# Patient Record
Sex: MALE | Race: WHITE | NOT HISPANIC OR LATINO | Employment: FULL TIME | URBAN - METROPOLITAN AREA
[De-identification: names, ages, dates, MRNs, and addresses within clinical notes are randomized per-mention and may not be internally consistent; named-entity substitution may affect disease eponyms.]

---

## 2023-02-28 LAB
ALANINE AMINOTRANSFERASE (SGPT) (U/L) IN SER/PLAS: 21 U/L (ref 10–52)
ALBUMIN (G/DL) IN SER/PLAS: 4.7 G/DL (ref 3.4–5)
ALKALINE PHOSPHATASE (U/L) IN SER/PLAS: 67 U/L (ref 33–136)
ANION GAP IN SER/PLAS: 12 MMOL/L (ref 10–20)
ASPARTATE AMINOTRANSFERASE (SGOT) (U/L) IN SER/PLAS: 30 U/L (ref 9–39)
BASOPHILS (10*3/UL) IN BLOOD BY AUTOMATED COUNT: 0.06 X10E9/L (ref 0–0.1)
BASOPHILS/100 LEUKOCYTES IN BLOOD BY AUTOMATED COUNT: 0.8 % (ref 0–2)
BILIRUBIN TOTAL (MG/DL) IN SER/PLAS: 0.7 MG/DL (ref 0–1.2)
CALCIDIOL (25 OH VITAMIN D3) (NG/ML) IN SER/PLAS: 19 NG/ML
CALCIUM (MG/DL) IN SER/PLAS: 9.8 MG/DL (ref 8.6–10.3)
CARBON DIOXIDE, TOTAL (MMOL/L) IN SER/PLAS: 27 MMOL/L (ref 21–32)
CHLORIDE (MMOL/L) IN SER/PLAS: 98 MMOL/L (ref 98–107)
COBALAMIN (VITAMIN B12) (PG/ML) IN SER/PLAS: 569 PG/ML (ref 211–911)
CREATININE (MG/DL) IN SER/PLAS: 0.92 MG/DL (ref 0.5–1.3)
EOSINOPHILS (10*3/UL) IN BLOOD BY AUTOMATED COUNT: 0.19 X10E9/L (ref 0–0.7)
EOSINOPHILS/100 LEUKOCYTES IN BLOOD BY AUTOMATED COUNT: 2.5 % (ref 0–6)
ERYTHROCYTE DISTRIBUTION WIDTH (RATIO) BY AUTOMATED COUNT: 12.6 % (ref 11.5–14.5)
ERYTHROCYTE MEAN CORPUSCULAR HEMOGLOBIN CONCENTRATION (G/DL) BY AUTOMATED: 33.4 G/DL (ref 32–36)
ERYTHROCYTE MEAN CORPUSCULAR VOLUME (FL) BY AUTOMATED COUNT: 96 FL (ref 80–100)
ERYTHROCYTES (10*6/UL) IN BLOOD BY AUTOMATED COUNT: 4.67 X10E12/L (ref 4.5–5.9)
GFR MALE: >90 ML/MIN/1.73M2
GLUCOSE (MG/DL) IN SER/PLAS: 96 MG/DL (ref 74–99)
HEMATOCRIT (%) IN BLOOD BY AUTOMATED COUNT: 44.6 % (ref 41–52)
HEMOGLOBIN (G/DL) IN BLOOD: 14.9 G/DL (ref 13.5–17.5)
IMMATURE GRANULOCYTES/100 LEUKOCYTES IN BLOOD BY AUTOMATED COUNT: 0.4 % (ref 0–0.9)
LEUKOCYTES (10*3/UL) IN BLOOD BY AUTOMATED COUNT: 7.7 X10E9/L (ref 4.4–11.3)
LYMPHOCYTES (10*3/UL) IN BLOOD BY AUTOMATED COUNT: 2.54 X10E9/L (ref 1.2–4.8)
LYMPHOCYTES/100 LEUKOCYTES IN BLOOD BY AUTOMATED COUNT: 33.2 % (ref 13–44)
MONOCYTES (10*3/UL) IN BLOOD BY AUTOMATED COUNT: 0.75 X10E9/L (ref 0.1–1)
MONOCYTES/100 LEUKOCYTES IN BLOOD BY AUTOMATED COUNT: 9.8 % (ref 2–10)
NEUTROPHILS (10*3/UL) IN BLOOD BY AUTOMATED COUNT: 4.08 X10E9/L (ref 1.2–7.7)
NEUTROPHILS/100 LEUKOCYTES IN BLOOD BY AUTOMATED COUNT: 53.3 % (ref 40–80)
PLATELETS (10*3/UL) IN BLOOD AUTOMATED COUNT: 355 X10E9/L (ref 150–450)
POTASSIUM (MMOL/L) IN SER/PLAS: 4 MMOL/L (ref 3.5–5.3)
PROTEIN TOTAL: 8.1 G/DL (ref 6.4–8.2)
SODIUM (MMOL/L) IN SER/PLAS: 133 MMOL/L (ref 136–145)
THYROTROPIN (MIU/L) IN SER/PLAS BY DETECTION LIMIT <= 0.05 MIU/L: 3.17 MIU/L (ref 0.44–3.98)
UREA NITROGEN (MG/DL) IN SER/PLAS: 11 MG/DL (ref 6–23)

## 2023-03-06 PROBLEM — R07.9 CHEST PAIN: Status: ACTIVE | Noted: 2023-03-06

## 2023-03-06 PROBLEM — J45.909 REACTIVE AIRWAY DISEASE (HHS-HCC): Status: ACTIVE | Noted: 2023-03-06

## 2023-03-06 PROBLEM — M47.9 DEGENERATIVE ARTHRITIS OF SPINE: Status: ACTIVE | Noted: 2023-03-06

## 2023-03-06 PROBLEM — K21.9 ESOPHAGEAL REFLUX: Status: ACTIVE | Noted: 2023-03-06

## 2023-03-06 PROBLEM — M25.511 BILATERAL SHOULDER PAIN: Status: ACTIVE | Noted: 2023-03-06

## 2023-03-06 PROBLEM — K40.90 INGUINAL HERNIA, RIGHT: Status: ACTIVE | Noted: 2023-03-06

## 2023-03-06 PROBLEM — M25.562 LEFT KNEE PAIN: Status: ACTIVE | Noted: 2023-03-06

## 2023-03-06 PROBLEM — R10.31 CHRONIC RLQ PAIN: Status: ACTIVE | Noted: 2023-03-06

## 2023-03-06 PROBLEM — E78.5 HYPERLIPIDEMIA: Status: ACTIVE | Noted: 2023-03-06

## 2023-03-06 PROBLEM — R21 SKIN RASH: Status: ACTIVE | Noted: 2023-03-06

## 2023-03-06 PROBLEM — R53.83 FATIGUE: Status: ACTIVE | Noted: 2023-03-06

## 2023-03-06 PROBLEM — G89.29 CHRONIC RLQ PAIN: Status: ACTIVE | Noted: 2023-03-06

## 2023-03-06 PROBLEM — N52.9 MALE ERECTILE DISORDER: Status: ACTIVE | Noted: 2023-03-06

## 2023-03-06 PROBLEM — R79.89 TSH ELEVATION: Status: ACTIVE | Noted: 2023-03-06

## 2023-03-06 PROBLEM — M25.512 BILATERAL SHOULDER PAIN: Status: ACTIVE | Noted: 2023-03-06

## 2023-03-06 PROBLEM — M70.21 OLECRANON BURSITIS OF RIGHT ELBOW: Status: ACTIVE | Noted: 2023-03-06

## 2023-03-06 PROBLEM — I10 BENIGN ESSENTIAL HYPERTENSION: Status: ACTIVE | Noted: 2023-03-06

## 2023-03-06 PROBLEM — R97.20 ELEVATED PSA: Status: ACTIVE | Noted: 2023-03-06

## 2023-03-06 PROBLEM — N52.9 INABILITY TO ATTAIN ERECTION: Status: ACTIVE | Noted: 2023-03-06

## 2023-03-06 PROBLEM — R97.20 INCREASED PROSTATE SPECIFIC ANTIGEN (PSA) VELOCITY: Status: ACTIVE | Noted: 2023-03-06

## 2023-03-06 PROBLEM — E55.9 VITAMIN D DEFICIENCY: Status: ACTIVE | Noted: 2023-03-06

## 2023-03-06 PROBLEM — Z87.2 HISTORY OF ROSACEA: Status: ACTIVE | Noted: 2023-03-06

## 2023-03-06 PROBLEM — M54.16 BILATERAL LUMBAR RADICULOPATHY: Status: ACTIVE | Noted: 2023-03-06

## 2023-03-06 PROBLEM — R60.0 LEG EDEMA: Status: ACTIVE | Noted: 2023-03-06

## 2023-03-06 PROBLEM — N40.1 ENLARGED PROSTATE WITH LOWER URINARY TRACT SYMPTOMS (LUTS): Status: ACTIVE | Noted: 2023-03-06

## 2023-03-06 RX ORDER — TADALAFIL 10 MG/1
10 TABLET ORAL
COMMUNITY
Start: 2021-08-10 | End: 2023-12-05 | Stop reason: SDUPTHER

## 2023-03-06 RX ORDER — TRIAMCINOLONE ACETONIDE 1 MG/G
CREAM TOPICAL
COMMUNITY
End: 2023-12-22 | Stop reason: WASHOUT

## 2023-03-06 RX ORDER — TADALAFIL 5 MG/1
5 TABLET ORAL
COMMUNITY
Start: 2020-03-06 | End: 2023-03-13 | Stop reason: ALTCHOICE

## 2023-03-06 RX ORDER — PANTOPRAZOLE SODIUM 40 MG/1
40 TABLET, DELAYED RELEASE ORAL
COMMUNITY
Start: 2014-01-20 | End: 2023-03-26 | Stop reason: SDUPTHER

## 2023-03-06 RX ORDER — SILDENAFIL 100 MG/1
100 TABLET, FILM COATED ORAL AS NEEDED
COMMUNITY
Start: 2021-09-22 | End: 2023-03-13 | Stop reason: ALTCHOICE

## 2023-03-06 RX ORDER — TADALAFIL 20 MG/1
20 TABLET ORAL DAILY PRN
COMMUNITY
Start: 2022-07-06 | End: 2023-03-13 | Stop reason: ALTCHOICE

## 2023-03-06 RX ORDER — TAMSULOSIN HYDROCHLORIDE 0.4 MG/1
0.4 CAPSULE ORAL DAILY
COMMUNITY
End: 2023-12-22 | Stop reason: WASHOUT

## 2023-03-06 RX ORDER — LISINOPRIL 5 MG/1
5 TABLET ORAL DAILY
COMMUNITY
Start: 2014-01-20 | End: 2023-03-28

## 2023-03-06 RX ORDER — ATORVASTATIN CALCIUM 80 MG/1
80 TABLET, FILM COATED ORAL DAILY
COMMUNITY
Start: 2015-01-31 | End: 2023-03-28

## 2023-03-06 RX ORDER — ERGOCALCIFEROL 1.25 MG/1
1 CAPSULE ORAL
COMMUNITY
End: 2023-12-22 | Stop reason: WASHOUT

## 2023-03-06 RX ORDER — FUROSEMIDE 40 MG/1
40 TABLET ORAL DAILY
COMMUNITY
Start: 2020-03-06 | End: 2023-03-28

## 2023-03-06 RX ORDER — NAPROXEN 500 MG/1
500 TABLET ORAL EVERY 12 HOURS PRN
COMMUNITY
End: 2023-12-22 | Stop reason: WASHOUT

## 2023-03-06 RX ORDER — BUDESONIDE AND FORMOTEROL FUMARATE DIHYDRATE 160; 4.5 UG/1; UG/1
2 AEROSOL RESPIRATORY (INHALATION)
COMMUNITY
End: 2023-12-22 | Stop reason: WASHOUT

## 2023-03-13 ENCOUNTER — OFFICE VISIT (OUTPATIENT)
Dept: PRIMARY CARE | Facility: CLINIC | Age: 68
End: 2023-03-13
Payer: COMMERCIAL

## 2023-03-13 VITALS
DIASTOLIC BLOOD PRESSURE: 70 MMHG | WEIGHT: 204 LBS | HEART RATE: 70 BPM | BODY MASS INDEX: 30.13 KG/M2 | SYSTOLIC BLOOD PRESSURE: 126 MMHG

## 2023-03-13 DIAGNOSIS — M25.562 ACUTE PAIN OF LEFT KNEE: ICD-10-CM

## 2023-03-13 DIAGNOSIS — M25.551 RIGHT HIP PAIN: ICD-10-CM

## 2023-03-13 DIAGNOSIS — M70.21 OLECRANON BURSITIS OF RIGHT ELBOW: Primary | ICD-10-CM

## 2023-03-13 PROCEDURE — 99214 OFFICE O/P EST MOD 30 MIN: CPT | Performed by: FAMILY MEDICINE

## 2023-03-13 PROCEDURE — 3074F SYST BP LT 130 MM HG: CPT | Performed by: FAMILY MEDICINE

## 2023-03-13 PROCEDURE — 20605 DRAIN/INJ JOINT/BURSA W/O US: CPT | Performed by: FAMILY MEDICINE

## 2023-03-13 PROCEDURE — 1159F MED LIST DOCD IN RCRD: CPT | Performed by: FAMILY MEDICINE

## 2023-03-13 PROCEDURE — 3078F DIAST BP <80 MM HG: CPT | Performed by: FAMILY MEDICINE

## 2023-03-13 RX ORDER — MELOXICAM 15 MG/1
15 TABLET ORAL DAILY
Qty: 30 TABLET | Refills: 0 | Status: SHIPPED | OUTPATIENT
Start: 2023-03-13 | End: 2023-04-12

## 2023-03-13 ASSESSMENT — PATIENT HEALTH QUESTIONNAIRE - PHQ9
2. FEELING DOWN, DEPRESSED OR HOPELESS: NOT AT ALL
SUM OF ALL RESPONSES TO PHQ9 QUESTIONS 1 AND 2: 0
1. LITTLE INTEREST OR PLEASURE IN DOING THINGS: NOT AT ALL

## 2023-03-13 NOTE — ASSESSMENT & PLAN NOTE
Olecranon bursa aspiration and injection performed in office today  Recommend keeping compressive dressing on as much as tolerated as these have a high chance of recurrence  If this comes back again, we will set you up with orthopedic surgery

## 2023-03-13 NOTE — ASSESSMENT & PLAN NOTE
X-rays largely unremarkable  Pain extends past the joint line and seem to follow along the pes anserine tendons.  Possible pes anserine bursitis  Referral to Dr. Berny Arango of sports medicine for ultrasound-guided treatment  Physical therapy referral  Meloxicam 15 mg daily

## 2023-03-13 NOTE — ASSESSMENT & PLAN NOTE
Most likely secondary to tight musculature and mild greater trochanteric bursitis  Physical therapy referral

## 2023-03-13 NOTE — PROGRESS NOTES
Subjective   Bal Rosado is a 67 y.o. male who presents for KNEE DRAIN and elbow bursa    HPI    1.  Left knee pain  Continues to be bothersome to him.  Had x-ray performed after last visit, which demonstrated no significant arthritis or acute changes.  Complaining of pain along the medial aspect of the knee, from distal thigh to Oliva's tubercle.  Has been icing with mild relief and taking ibuprofen with only mild relief.  Also complaining of popping sensation along the medial aspect of the knee, which causes significant discomfort.  The pain has been so bad it is keeping him up at night.  He works as a Pete.  Says that he is constantly resting horse shoes against the medial side of his left knee.  Has a history of left knee arthroscopy for partial meniscectomy 2007.    2.  Right elbow olecranon bursitis  Had this drained on 2/28/2023 and was told that he had a high chance of recurrence.  States the swelling came back just a few days later.  Was told that if it recurred, would be aspirated once more and injected with steroid.  If it happens a third time, referral to Ortho.    3.  Right hip pain  Says this has been going on for several years.  Pain comes and goes.  Pain mostly along the lateral aspect, but could also be along the posterior aspect and can occasionally radiate into the groin.  No numbness or tingling into the extremities.    ROS: All pertinent positive symptoms are included in the history of present illness.    All other systems have been reviewed and are negative and noncontributory to this patient's current ailments.    Objective     Vitals:    03/13/23 0736   BP: 126/70   Pulse: 70   Weight: 92.5 kg (204 lb)       Physical Exam  CONSTITUTIONAL - well nourished, well developed, looks like stated age, in no acute distress, not ill-appearing, and not tired appearing  SKIN - No lesions or rashes visualized.   HEAD - Atraumatic, normocephalic.  EYES - EOMI with normal external exam  RESP -  respiration not labored   CARDIAC - extremities warm, well-perfused  PSYCHIATRIC - alert, oriented to time, place, person and no difficulty with speech or language  MUSCULOSKELETAL - left knee demonstrates medial joint line as well as medial Pez anserine tenderness to palpation.  Negative Thessaly, negative Lachman, stable varus/valgus stress.  Mild medial pain with valgus  Right hip demonstrates mild greater trochanter tenderness palpation, tight musculature, negative logroll, no pain with internal/external rotation of the hip, mild pain along the posterior musculature with straight leg raise  Right elbow demonstrates recurrent olecranon bursitis without erythema nor drainage    Procedures  The site of aspiration was cleansed using Betadine.  Right elbow bursa was aspirated using an 18-gauge needle.  7 mL of blood tinged clear fluid was aspirated.  While maintaining the needle, 20 mg triamcinolone was injected into the bursa.  Elbow was then wrapped with compressive dressing.  Patient tolerated procedure well with no immediate complications    Assessment/Plan   Problem List Items Addressed This Visit       Left knee pain     X-rays largely unremarkable  Pain extends past the joint line and seem to follow along the pes anserine tendons.  Possible pes anserine bursitis  Referral to Dr. Berny Arango of sports medicine for ultrasound-guided treatment  Physical therapy referral  Meloxicam 15 mg daily         Relevant Medications    meloxicam (Mobic) 15 mg tablet    Other Relevant Orders    Referral to Sports Medicine    Olecranon bursitis of right elbow - Primary     Olecranon bursa aspiration and injection performed in office today  Recommend keeping compressive dressing on as much as tolerated as these have a high chance of recurrence  If this comes back again, we will set you up with orthopedic surgery         Relevant Medications    triamcinolone acetonide (Kenalog) injection 20 mg    Right hip pain     Most likely  secondary to tight musculature and mild greater trochanteric bursitis  Physical therapy referral         Relevant Medications    meloxicam (Mobic) 15 mg tablet    Other Relevant Orders    Referral to Sports Medicine    Referral to Physical Therapy

## 2023-03-25 DIAGNOSIS — R60.0 LOCALIZED EDEMA: ICD-10-CM

## 2023-03-25 DIAGNOSIS — I10 ESSENTIAL (PRIMARY) HYPERTENSION: ICD-10-CM

## 2023-03-25 DIAGNOSIS — E78.5 HYPERLIPIDEMIA, UNSPECIFIED: ICD-10-CM

## 2023-03-25 DIAGNOSIS — K21.9 GASTRO-ESOPHAGEAL REFLUX DISEASE WITHOUT ESOPHAGITIS: ICD-10-CM

## 2023-03-26 DIAGNOSIS — K21.9 GASTROESOPHAGEAL REFLUX DISEASE WITHOUT ESOPHAGITIS: Primary | ICD-10-CM

## 2023-03-26 RX ORDER — PANTOPRAZOLE SODIUM 40 MG/1
40 TABLET, DELAYED RELEASE ORAL
Qty: 90 TABLET | Refills: 1 | Status: SHIPPED | OUTPATIENT
Start: 2023-03-26 | End: 2023-10-10 | Stop reason: SDUPTHER

## 2023-03-28 RX ORDER — LISINOPRIL 5 MG/1
5 TABLET ORAL DAILY
Qty: 90 TABLET | Refills: 0 | Status: SHIPPED | OUTPATIENT
Start: 2023-03-28 | End: 2023-06-28 | Stop reason: SDUPTHER

## 2023-03-28 RX ORDER — PANTOPRAZOLE SODIUM 40 MG/1
40 TABLET, DELAYED RELEASE ORAL
Qty: 30 TABLET | Refills: 0 | OUTPATIENT
Start: 2023-03-28 | End: 2023-04-27

## 2023-03-28 RX ORDER — FUROSEMIDE 40 MG/1
40 TABLET ORAL DAILY
Qty: 90 TABLET | Refills: 0 | Status: SHIPPED | OUTPATIENT
Start: 2023-03-28 | End: 2023-12-22 | Stop reason: WASHOUT

## 2023-03-28 RX ORDER — ATORVASTATIN CALCIUM 80 MG/1
80 TABLET, FILM COATED ORAL DAILY
Qty: 90 TABLET | Refills: 0 | Status: SHIPPED | OUTPATIENT
Start: 2023-03-28 | End: 2023-06-28 | Stop reason: SDUPTHER

## 2023-04-07 LAB
PROSTATE SPECIFIC AG (NG/ML) IN SER/PLAS: 4.5 NG/ML (ref 0–4)
PROSTATE SPECIFIC AG FREE (NG/ML) IN SER/PLAS: 0.7 NG/ML
PROSTATE SPECIFIC AG FREE/PROSTATE SPECIFIC AG TOTAL IN SER/PLAS: 16 %

## 2023-05-17 ENCOUNTER — TELEPHONE (OUTPATIENT)
Dept: PRIMARY CARE | Facility: CLINIC | Age: 68
End: 2023-05-17
Payer: COMMERCIAL

## 2023-05-17 DIAGNOSIS — G89.29 CHRONIC PAIN OF LEFT KNEE: Primary | ICD-10-CM

## 2023-05-17 DIAGNOSIS — M25.562 CHRONIC PAIN OF LEFT KNEE: Primary | ICD-10-CM

## 2023-06-06 ENCOUNTER — OFFICE VISIT (OUTPATIENT)
Dept: PRIMARY CARE | Facility: CLINIC | Age: 68
End: 2023-06-06
Payer: COMMERCIAL

## 2023-06-06 ENCOUNTER — LAB (OUTPATIENT)
Dept: LAB | Facility: LAB | Age: 68
End: 2023-06-06
Payer: COMMERCIAL

## 2023-06-06 VITALS
SYSTOLIC BLOOD PRESSURE: 122 MMHG | BODY MASS INDEX: 28.8 KG/M2 | HEART RATE: 70 BPM | WEIGHT: 195 LBS | DIASTOLIC BLOOD PRESSURE: 72 MMHG

## 2023-06-06 DIAGNOSIS — R50.9 FUO (FEVER OF UNKNOWN ORIGIN): ICD-10-CM

## 2023-06-06 DIAGNOSIS — R35.1 NOCTURIA: ICD-10-CM

## 2023-06-06 DIAGNOSIS — R50.9 FUO (FEVER OF UNKNOWN ORIGIN): Primary | ICD-10-CM

## 2023-06-06 DIAGNOSIS — G44.89 OTHER HEADACHE SYNDROME: ICD-10-CM

## 2023-06-06 DIAGNOSIS — R21 RASH: ICD-10-CM

## 2023-06-06 DIAGNOSIS — R61 NIGHT SWEATS: ICD-10-CM

## 2023-06-06 DIAGNOSIS — L03.113 CELLULITIS OF RIGHT FOREARM: ICD-10-CM

## 2023-06-06 DIAGNOSIS — R11.0 NAUSEA: ICD-10-CM

## 2023-06-06 DIAGNOSIS — R63.0 NO APPETITE: ICD-10-CM

## 2023-06-06 PROBLEM — M54.50 ACUTE BILATERAL LOW BACK PAIN WITHOUT SCIATICA: Status: ACTIVE | Noted: 2023-06-06

## 2023-06-06 PROBLEM — R29.898 LEG WEAKNESS: Status: ACTIVE | Noted: 2023-06-06

## 2023-06-06 PROBLEM — M17.12 ARTHRITIS OF KNEE, LEFT: Status: ACTIVE | Noted: 2023-06-06

## 2023-06-06 LAB
BASOPHILS (10*3/UL) IN BLOOD BY AUTOMATED COUNT: 0.04 X10E9/L (ref 0–0.1)
BASOPHILS/100 LEUKOCYTES IN BLOOD BY AUTOMATED COUNT: 0.7 % (ref 0–2)
C REACTIVE PROTEIN (MG/L) IN SER/PLAS: 14.22 MG/DL
EOSINOPHILS (10*3/UL) IN BLOOD BY AUTOMATED COUNT: 0.03 X10E9/L (ref 0–0.7)
EOSINOPHILS/100 LEUKOCYTES IN BLOOD BY AUTOMATED COUNT: 0.5 % (ref 0–6)
ERYTHROCYTE DISTRIBUTION WIDTH (RATIO) BY AUTOMATED COUNT: 12.6 % (ref 11.5–14.5)
ERYTHROCYTE MEAN CORPUSCULAR HEMOGLOBIN CONCENTRATION (G/DL) BY AUTOMATED: 34.6 G/DL (ref 32–36)
ERYTHROCYTE MEAN CORPUSCULAR VOLUME (FL) BY AUTOMATED COUNT: 95 FL (ref 80–100)
ERYTHROCYTES (10*6/UL) IN BLOOD BY AUTOMATED COUNT: 4.25 X10E12/L (ref 4.5–5.9)
HEMATOCRIT (%) IN BLOOD BY AUTOMATED COUNT: 40.5 % (ref 41–52)
HEMOGLOBIN (G/DL) IN BLOOD: 14 G/DL (ref 13.5–17.5)
IMMATURE GRANULOCYTES/100 LEUKOCYTES IN BLOOD BY AUTOMATED COUNT: 0.2 % (ref 0–0.9)
LEUKOCYTES (10*3/UL) IN BLOOD BY AUTOMATED COUNT: 6.1 X10E9/L (ref 4.4–11.3)
LYMPHOCYTES (10*3/UL) IN BLOOD BY AUTOMATED COUNT: 0.78 X10E9/L (ref 1.2–4.8)
LYMPHOCYTES/100 LEUKOCYTES IN BLOOD BY AUTOMATED COUNT: 12.7 % (ref 13–44)
MONOCYTES (10*3/UL) IN BLOOD BY AUTOMATED COUNT: 0.66 X10E9/L (ref 0.1–1)
MONOCYTES/100 LEUKOCYTES IN BLOOD BY AUTOMATED COUNT: 10.8 % (ref 2–10)
NEUTROPHILS (10*3/UL) IN BLOOD BY AUTOMATED COUNT: 4.61 X10E9/L (ref 1.2–7.7)
NEUTROPHILS/100 LEUKOCYTES IN BLOOD BY AUTOMATED COUNT: 75.1 % (ref 40–80)
PLATELETS (10*3/UL) IN BLOOD AUTOMATED COUNT: 210 X10E9/L (ref 150–450)
POC APPEARANCE, URINE: CLEAR
POC BILIRUBIN, URINE: NEGATIVE
POC BLOOD, URINE: NEGATIVE
POC COLOR, URINE: YELLOW
POC GLUCOSE, URINE: NEGATIVE MG/DL
POC KETONES, URINE: NEGATIVE MG/DL
POC LEUKOCYTES, URINE: NEGATIVE
POC NITRITE,URINE: NEGATIVE
POC PH, URINE: 6 PH
POC PROTEIN, URINE: ABNORMAL MG/DL
POC SPECIFIC GRAVITY, URINE: 1.02
POC UROBILINOGEN, URINE: 0.2 EU/DL
SEDIMENTATION RATE, ERYTHROCYTE: 20 MM/H (ref 0–20)

## 2023-06-06 PROCEDURE — 1159F MED LIST DOCD IN RCRD: CPT | Performed by: FAMILY MEDICINE

## 2023-06-06 PROCEDURE — 87086 URINE CULTURE/COLONY COUNT: CPT

## 2023-06-06 PROCEDURE — 85025 COMPLETE CBC W/AUTO DIFF WBC: CPT

## 2023-06-06 PROCEDURE — 3078F DIAST BP <80 MM HG: CPT | Performed by: FAMILY MEDICINE

## 2023-06-06 PROCEDURE — 99214 OFFICE O/P EST MOD 30 MIN: CPT | Performed by: FAMILY MEDICINE

## 2023-06-06 PROCEDURE — 85652 RBC SED RATE AUTOMATED: CPT

## 2023-06-06 PROCEDURE — 86140 C-REACTIVE PROTEIN: CPT

## 2023-06-06 PROCEDURE — 1036F TOBACCO NON-USER: CPT | Performed by: FAMILY MEDICINE

## 2023-06-06 PROCEDURE — 81002 URINALYSIS NONAUTO W/O SCOPE: CPT | Performed by: FAMILY MEDICINE

## 2023-06-06 PROCEDURE — 36415 COLL VENOUS BLD VENIPUNCTURE: CPT

## 2023-06-06 PROCEDURE — 3074F SYST BP LT 130 MM HG: CPT | Performed by: FAMILY MEDICINE

## 2023-06-06 PROCEDURE — 87040 BLOOD CULTURE FOR BACTERIA: CPT

## 2023-06-06 RX ORDER — DOXYCYCLINE 100 MG/1
100 CAPSULE ORAL 2 TIMES DAILY
Qty: 14 CAPSULE | Refills: 0 | Status: SHIPPED | OUTPATIENT
Start: 2023-06-06 | End: 2023-06-13

## 2023-06-06 RX ORDER — ONDANSETRON HYDROCHLORIDE 8 MG/1
8 TABLET, FILM COATED ORAL EVERY 8 HOURS PRN
Qty: 20 TABLET | Refills: 0 | Status: SHIPPED | OUTPATIENT
Start: 2023-06-06 | End: 2023-06-16

## 2023-06-06 RX ORDER — SILDENAFIL 100 MG/1
TABLET, FILM COATED ORAL
COMMUNITY
Start: 2021-09-22 | End: 2023-12-05 | Stop reason: SDUPTHER

## 2023-06-06 RX ORDER — ASPIRIN 81 MG/1
1 TABLET ORAL DAILY
COMMUNITY
Start: 2022-07-02 | End: 2023-12-22 | Stop reason: WASHOUT

## 2023-06-06 ASSESSMENT — PATIENT HEALTH QUESTIONNAIRE - PHQ9
2. FEELING DOWN, DEPRESSED OR HOPELESS: NOT AT ALL
1. LITTLE INTEREST OR PLEASURE IN DOING THINGS: NOT AT ALL
SUM OF ALL RESPONSES TO PHQ9 QUESTIONS 1 AND 2: 0

## 2023-06-06 NOTE — PROGRESS NOTES
Subjective   Patient ID: Bal Rosado is a 67 y.o. male who presents for Chills.    PAMELA Selby is visiting with me secondary to a 4-day history of elevated temperature greater than 102 °F, with shaking, chills, night sweats, and a rash that developed on his right forearm.  Headaches have also began over the past several days, telling me that he was driving down to [place] a few days ago and started to develop a bilateral temporal headache without visual disturbances or other aura.  Claims to have not had anything to eat for the past 4 days, and has nausea without vomiting or diarrhea.    At the end of April, he was seen by urology secondary to a PSA that is elevated, prostate biopsy was negative in 2021, but he continues to have weak stream and nocturia.  No improvement with the use of Flomax or Cialis, and a cystoscopy revealed obstructive prostate.  Dr. Mays discussed with him different options including Rezum, Urolift, Greenlight PVP, HOLEP/HOLAP, TURP, and Simple prostatectomy, but after that visit they just decided to monitor and follow-up in 6 months.    He continues to have problems with urinary urge, but is unable to urinate unless he walks around the house in the middle of the night.  Over the past several nights he has had nocturia x7 without any dysuria, hematuria, but is concerned that he has not been feeling well with the symptoms as noted above.    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  The rash seems to be localized, warm, a bit tender, but not painful, no discharge.    Review of Systems  All pertinent positive symptoms are included in the history of present illness.    All other systems have been reviewed and are negative and noncontributory to this patient's current ailments.    No Known Allergies    Immunization History   Administered Date(s)  Administered    Influenza, seasonal, injectable 10/08/2020    Pfizer Purple Cap SARS-CoV-2 03/31/2021, 04/17/2021, 12/27/2021    Pneumococcal Conjugate PCV 13 03/14/2017    Pneumococcal Polysaccharide PPSV23 03/09/2016, 08/10/2021    SARS-CoV-2, Unspecified 08/02/2022    TD (adult), 2 Lf tetanus toxoid, preservative free, adsorbed 07/01/2018    Tdap 07/14/2011, 07/01/2017       Objective   Visit Vitals  /72   Pulse 70   Wt 88.5 kg (195 lb)   BMI 28.80 kg/m²   Smoking Status Never   BSA 2.08 m²       Physical Exam  CONSTITUTIONAL - well nourished, well developed, looks like stated age, in no acute distress, not ill-appearing, and not tired appearing  SKIN - normal skin color and pigmentation, normal skin turgor without lesions, or nodules visualized; right forearm with erythematous based patch of skin which is warm, no discharge, no vesicles, well-demarcated, on the ulnar aspect, distal shaft, no openings in the skin are noted, no foreign objects are noted  HEAD - no trauma, normocephalic  EYES - pupils are equal and reactive to light, extraocular muscles are intact, and normal external exam  ENT - TM's intact, no injection, no signs of infection, uvula midline, normal tongue movement and throat normal, no exudate  NECK - supple without rigidity, no neck mass was observed, no thyromegaly or thyroid nodules  CHEST - clear to auscultation, no wheezing, no crackles and no rales, good effort  CARDIAC - regular rate and regular rhythm, no skipped beats, no murmur  EXTREMITIES - no edema, no deformities  NEUROLOGICAL - normal gait, normal balance, normal motor, no ataxia, DTRs equal and symmetrical; alert, oriented and no focal signs  PSYCHIATRIC - alert, pleasant and cordial, age-appropriate  IMMUNOLOGIC - no cervical lymphadenopathy     Assessment/Plan   Problem List Items Addressed This Visit       Rash    Fever - Primary     We are going to work this up with blood work, blood cultures, urine culture, and  suggested to consider chest x-ray as well    Urinalysis in the office is normal, but we will send for culture    Please make sure you stay hydrated  I encouraged supportive care such as rest, fluids and Advil/Tylenol as warranted  Return to the clinic in 7-10 days or sooner if symptoms worsen or persist as we will then further evaluate         Relevant Medications    doxycycline (Vibramycin) 100 mg capsule    Nocturia     Could be related to BPH, but also could be related to a urinary tract infection  Urine culture pending         Night sweats     Do not think this is related to TB, but chest x-ray was suggested  Likely secondary to your body trying to compensate for the fever that you are experiencing         Nausea     I am going to send medication over to the pharmacy to help with your nausea, but again I suspect this is because you have some sort of infection that we have yet to identify         Relevant Medications    ondansetron (Zofran) 8 mg tablet    No appetite    Relevant Medications    ondansetron (Zofran) 8 mg tablet    Other headache syndrome     Difficult to know etiology of the headache, but it may have to do with the fact that you are currently experiencing an illness which could be related to a systemic problem such as sepsis    Suggested the use of OTC medication like Advil/Tylenol to help with headache symptoms, at least initially    If the headache starts to turn into the worst headache of your life, please get to the ER immediately         Cellulitis of right forearm     Start doxycycline 100 mg twice daily x7 days for this infection  I am hoping that this is the source of your FUO, as this should take care of this infection         Relevant Medications    doxycycline (Vibramycin) 100 mg capsule

## 2023-06-06 NOTE — ASSESSMENT & PLAN NOTE
We are going to work this up with blood work, blood cultures, urine culture, and suggested to consider chest x-ray as well    Urinalysis in the office is normal, but we will send for culture    Please make sure you stay hydrated  I encouraged supportive care such as rest, fluids and Advil/Tylenol as warranted  Return to the clinic in 7-10 days or sooner if symptoms worsen or persist as we will then further evaluate

## 2023-06-06 NOTE — ASSESSMENT & PLAN NOTE
Do not think this is related to TB, but chest x-ray was suggested  Likely secondary to your body trying to compensate for the fever that you are experiencing

## 2023-06-06 NOTE — ASSESSMENT & PLAN NOTE
I am going to send medication over to the pharmacy to help with your nausea, but again I suspect this is because you have some sort of infection that we have yet to identify

## 2023-06-06 NOTE — ASSESSMENT & PLAN NOTE
Start doxycycline 100 mg twice daily x7 days for this infection  I am hoping that this is the source of your FUO, as this should take care of this infection

## 2023-06-06 NOTE — ASSESSMENT & PLAN NOTE
Difficult to know etiology of the headache, but it may have to do with the fact that you are currently experiencing an illness which could be related to a systemic problem such as sepsis    Suggested the use of OTC medication like Advil/Tylenol to help with headache symptoms, at least initially    If the headache starts to turn into the worst headache of your life, please get to the ER immediately

## 2023-06-06 NOTE — ASSESSMENT & PLAN NOTE
Could be related to BPH, but also could be related to a urinary tract infection  Urine culture pending

## 2023-06-07 LAB — URINE CULTURE: NORMAL

## 2023-06-07 NOTE — RESULT ENCOUNTER NOTE
Initial blood culture is negative, but that still in progress    We have what is called a C-reactive protein which is a marker of inflammation which is significantly elevated, likely secondary to what ever is transpiring in your bloodstream    Your WBC and your immature white blood cells are normal which leads me to believe that this may not be bacteria in nature, but is difficult to tell from this    Urine culture is still pending

## 2023-06-09 NOTE — RESULT ENCOUNTER NOTE
Urine culture is negative for any signs of infection.  Blood culture has not revealed any systemic bloodstream infection for the past 2 days    It makes me believe that your infection is likely coming from the rash that was on your wrist so if you are not better we need to continue to figure this out which may include a dermatology evaluation if that rash continues to persist.  If the fever continues to persist, we may have to get infectious disease involved as well

## 2023-06-10 LAB — BLOOD CULTURE: NORMAL

## 2023-06-12 ENCOUNTER — TELEPHONE (OUTPATIENT)
Dept: PRIMARY CARE | Facility: CLINIC | Age: 68
End: 2023-06-12
Payer: COMMERCIAL

## 2023-06-12 NOTE — TELEPHONE ENCOUNTER
----- Message from Anibal Dumont DO sent at 6/9/2023  1:34 PM EDT -----  Urine culture is negative for any signs of infection.  Blood culture has not revealed any systemic bloodstream infection for the past 2 days    It makes me believe that your infection is likely coming from the rash that was on your wrist so if you are not better we need to continue to figure this out which may include a dermatology evaluation if that rash continues to persist.  If the fever continues to persist, we may have to get infectious disease involved as well

## 2023-06-13 NOTE — RESULT ENCOUNTER NOTE
Final report on your blood culture is negative so there is no systemic infection.  Please keep me up-to-date as to whether or not you are feeling better

## 2023-06-13 NOTE — RESULT ENCOUNTER NOTE
Thank you for having the chest x-ray done, there is no evidence of infection so if you continue to have the fever and night sweats, we need to get you evaluated by infectious disease

## 2023-06-28 ENCOUNTER — OFFICE VISIT (OUTPATIENT)
Dept: PRIMARY CARE | Facility: CLINIC | Age: 68
End: 2023-06-28
Payer: COMMERCIAL

## 2023-06-28 VITALS
BODY MASS INDEX: 28.65 KG/M2 | HEART RATE: 70 BPM | WEIGHT: 194 LBS | SYSTOLIC BLOOD PRESSURE: 124 MMHG | DIASTOLIC BLOOD PRESSURE: 82 MMHG

## 2023-06-28 DIAGNOSIS — S83.242A ACUTE MEDIAL MENISCUS TEAR OF LEFT KNEE, INITIAL ENCOUNTER: ICD-10-CM

## 2023-06-28 DIAGNOSIS — I10 ESSENTIAL (PRIMARY) HYPERTENSION: ICD-10-CM

## 2023-06-28 DIAGNOSIS — Z00.00 HEALTH MAINTENANCE EXAMINATION: Primary | ICD-10-CM

## 2023-06-28 DIAGNOSIS — M25.562 CHRONIC PAIN OF LEFT KNEE: ICD-10-CM

## 2023-06-28 DIAGNOSIS — E78.2 MIXED HYPERLIPIDEMIA: ICD-10-CM

## 2023-06-28 DIAGNOSIS — Z01.818 PRE-OPERATIVE CLEARANCE: ICD-10-CM

## 2023-06-28 DIAGNOSIS — G89.29 CHRONIC PAIN OF LEFT KNEE: ICD-10-CM

## 2023-06-28 PROBLEM — R07.9 CHEST PAIN: Status: RESOLVED | Noted: 2023-03-06 | Resolved: 2023-06-28

## 2023-06-28 PROBLEM — R50.9 FEVER: Status: RESOLVED | Noted: 2023-06-06 | Resolved: 2023-06-28

## 2023-06-28 PROBLEM — R61 NIGHT SWEATS: Status: RESOLVED | Noted: 2023-06-06 | Resolved: 2023-06-28

## 2023-06-28 PROBLEM — E78.5 HYPERLIPIDEMIA, UNSPECIFIED: Status: RESOLVED | Noted: 2023-06-28 | Resolved: 2023-06-28

## 2023-06-28 PROBLEM — M54.50 ACUTE BILATERAL LOW BACK PAIN WITHOUT SCIATICA: Status: RESOLVED | Noted: 2023-06-06 | Resolved: 2023-06-28

## 2023-06-28 PROBLEM — E78.5 HYPERLIPIDEMIA, UNSPECIFIED: Status: ACTIVE | Noted: 2023-06-28

## 2023-06-28 PROBLEM — R63.0 NO APPETITE: Status: RESOLVED | Noted: 2023-06-06 | Resolved: 2023-06-28

## 2023-06-28 PROBLEM — R21 RASH: Status: RESOLVED | Noted: 2023-03-06 | Resolved: 2023-06-28

## 2023-06-28 PROBLEM — R11.0 NAUSEA: Status: RESOLVED | Noted: 2023-06-06 | Resolved: 2023-06-28

## 2023-06-28 PROBLEM — L03.113 CELLULITIS OF RIGHT FOREARM: Status: RESOLVED | Noted: 2023-06-06 | Resolved: 2023-06-28

## 2023-06-28 PROBLEM — R60.0 LEG EDEMA: Status: RESOLVED | Noted: 2023-03-06 | Resolved: 2023-06-28

## 2023-06-28 PROCEDURE — 3079F DIAST BP 80-89 MM HG: CPT | Performed by: FAMILY MEDICINE

## 2023-06-28 PROCEDURE — 99397 PER PM REEVAL EST PAT 65+ YR: CPT | Performed by: FAMILY MEDICINE

## 2023-06-28 PROCEDURE — 3074F SYST BP LT 130 MM HG: CPT | Performed by: FAMILY MEDICINE

## 2023-06-28 PROCEDURE — 93000 ELECTROCARDIOGRAM COMPLETE: CPT | Performed by: FAMILY MEDICINE

## 2023-06-28 PROCEDURE — 1036F TOBACCO NON-USER: CPT | Performed by: FAMILY MEDICINE

## 2023-06-28 PROCEDURE — 1170F FXNL STATUS ASSESSED: CPT | Performed by: FAMILY MEDICINE

## 2023-06-28 PROCEDURE — 1159F MED LIST DOCD IN RCRD: CPT | Performed by: FAMILY MEDICINE

## 2023-06-28 PROCEDURE — 99213 OFFICE O/P EST LOW 20 MIN: CPT | Performed by: FAMILY MEDICINE

## 2023-06-28 RX ORDER — ATORVASTATIN CALCIUM 80 MG/1
80 TABLET, FILM COATED ORAL DAILY
Qty: 90 TABLET | Refills: 1 | Status: SHIPPED | OUTPATIENT
Start: 2023-06-28 | End: 2023-12-22 | Stop reason: SDUPTHER

## 2023-06-28 RX ORDER — LISINOPRIL 5 MG/1
5 TABLET ORAL DAILY
Qty: 90 TABLET | Refills: 1 | Status: SHIPPED | OUTPATIENT
Start: 2023-06-28 | End: 2023-12-22 | Stop reason: SDUPTHER

## 2023-06-28 ASSESSMENT — ACTIVITIES OF DAILY LIVING (ADL)
MANAGING_FINANCES: INDEPENDENT
GROCERY_SHOPPING: INDEPENDENT
TAKING_MEDICATION: INDEPENDENT
DOING_HOUSEWORK: INDEPENDENT
BATHING: INDEPENDENT
DRESSING: INDEPENDENT

## 2023-06-28 ASSESSMENT — ENCOUNTER SYMPTOMS
DEPRESSION: 0
OCCASIONAL FEELINGS OF UNSTEADINESS: 0
LOSS OF SENSATION IN FEET: 0

## 2023-06-28 ASSESSMENT — PATIENT HEALTH QUESTIONNAIRE - PHQ9
1. LITTLE INTEREST OR PLEASURE IN DOING THINGS: NOT AT ALL
2. FEELING DOWN, DEPRESSED OR HOPELESS: NOT AT ALL
SUM OF ALL RESPONSES TO PHQ9 QUESTIONS 1 AND 2: 0

## 2023-06-28 NOTE — ASSESSMENT & PLAN NOTE
I do not anticipate any problems with presurgical clearance, and will formulate a letter to Dr. Beltrán once I am able to review all the data

## 2023-06-28 NOTE — PROGRESS NOTES
Subjective   Patient ID: Bal Rosado is a 67 y.o. male who presents for Hypertension and Hyperlipidemia.    HPI  1.  Physical exam/preoperative clearance.  Left-sided medial meniscal tear, severe arthritic changes with findings concerning for subchondral insufficiency fracture in the medial femoral condyle  We will be scheduling for arthroscopic meniscectomy with Dr. Beltrán following today's visit  Colonoscopy: last done February 2017 with 10 year clearance  Immunizations: Tdap 2017, declines shingles and pneumonia vaccine    2.  Hypertension  Controlled, 124/82 in office today  Taking lisinopril 5 mg daily, tolerates well without side effects, requesting refill  Denies cardiopulmonary symptoms    3.  Hyperlipidemia  Lipid panels have been WNL for few years now, last done in August 2022  Taking atorvastatin 80 mg daily, tolerates well side effects, requesting refill    Review of Systems  All pertinent positive symptoms are included in the history of present illness.    All other systems have been reviewed and are negative and noncontributory to this patient's current ailments.    Current Outpatient Medications   Medication Instructions    aspirin 81 mg EC tablet 1 tablet, oral, Daily    atorvastatin (LIPITOR) 80 mg, oral, Daily    budesonide-formoteroL (Symbicort) 160-4.5 mcg/actuation inhaler 2 puffs, inhalation, 2 times daily RT, Rinse mouth after use.    ergocalciferol (Vitamin D-2) 1.25 MG (40245 UT) capsule 1 capsule, oral, Weekly    furosemide (LASIX) 40 mg, oral, Daily    lisinopril 5 mg, oral, Daily    naproxen (NAPROSYN) 500 mg, oral, Every 12 hours PRN    pantoprazole (PROTONIX) 40 mg, oral, Daily before breakfast, Take 30 minutes before breakfast    sildenafil (Viagra) 100 mg tablet oral    tadalafil (CIALIS) 10 mg, oral, TAKE 1 TABLET 1 HOUR BEFORE ACTIVITY EVERY 36 HRS PRN    tamsulosin (FLOMAX) 0.4 mg, oral, Daily    triamcinolone (Kenalog) 0.1 % cream Topical, Apply 2 to 3 times daily to affected  area(s)     No Known Allergies    Immunization History   Administered Date(s) Administered    Influenza, seasonal, injectable 10/08/2020    Pfizer Purple Cap SARS-CoV-2 03/31/2021, 04/17/2021, 12/27/2021    Pneumococcal Conjugate PCV 13 03/14/2017    Pneumococcal Polysaccharide PPSV23 03/09/2016, 08/10/2021    SARS-CoV-2, Unspecified 08/02/2022    TD (adult), 2 Lf tetanus toxoid, preservative free, adsorbed 07/01/2018    Tdap 07/14/2011, 07/01/2017     Past Surgical History:   Procedure Laterality Date    HERNIA REPAIR  01/20/2014    Hernia Repair    KNEE SURGERY  01/20/2014    Knee Surgery     Family History   Problem Relation Name Age of Onset    Gaucher's disease Daughter      Gaucher's disease Son      Lung cancer Other      Leukemia Other       Social History     Tobacco Use    Smoking status: Never    Smokeless tobacco: Never       Objective   Visit Vitals  /82   Pulse 70   Wt 88 kg (194 lb)   BMI 28.65 kg/m²   Smoking Status Never   BSA 2.07 m²       Physical Exam  CONSTITUTIONAL - well nourished, well developed, looks like stated age, in no acute distress, not ill-appearing, and not tired appearing  SKIN - normal skin color and pigmentation, normal skin turgor without rash, lesions, or nodules visualized  HEAD - no trauma, normocephalic  CHEST - clear to auscultation, no wheezing, no crackles and no rales, good effort  CARDIAC - bradycardic rate and regular rhythm, no skipped beats, no murmur  EXTREMITIES - no edema, no deformities  NEUROLOGICAL - normal gait, normal balance, normal motor  PSYCHIATRIC - alert, pleasant and cordial, age-appropriate     Assessment/Plan   Problem List Items Addressed This Visit       Hyperlipidemia    Relevant Medications    atorvastatin (Lipitor) 80 mg tablet    Left knee pain    Pre-operative clearance     Preoperative labs have been ordered  Please have these done within 7 days of your procedure  We will notify of test results once available and provide a letter to  your surgeon once I am able to review all of the test results and determine if medical clearance is appropriate         Relevant Orders    Type And Screen    Protime-INR    CBC and Auto Differential    Comprehensive Metabolic Panel    Urinalysis with Reflex Microscopic and Culture    ECG 12 lead (Clinic Performed) (Completed)    Health maintenance examination - Primary     Complete history and physical exam performed  EKG reveals sinus bradycardia without acute changes  Tdap up-to-date, shingles and pneumonia declined  Fasting lab work to be completed following today's visit, but make sure it is done within 7 days of the surgery  We will call with results and make treatment recommendations accordingly         Relevant Orders    Lipid Panel    Essential (primary) hypertension     Stable, no changes  Refill for lisinopril 5 mg sent to pharmacy         Relevant Medications    lisinopril 5 mg tablet    Acute medial meniscus tear of left knee     I do not anticipate any problems with presurgical clearance, and will formulate a letter to Dr. Beltrán once I am able to review all the data

## 2023-06-28 NOTE — ASSESSMENT & PLAN NOTE
6-month refill of atorvastatin pending lipid panel which was ordered today  Since you are completely out of this medication, I have sent in a 1 month supply to your pharmacy

## 2023-06-28 NOTE — ASSESSMENT & PLAN NOTE
Complete history and physical exam performed  EKG reveals sinus bradycardia without acute changes  Tdap up-to-date, shingles and pneumonia declined  Fasting lab work to be completed following today's visit, but make sure it is done within 7 days of the surgery  We will call with results and make treatment recommendations accordingly

## 2023-06-28 NOTE — ASSESSMENT & PLAN NOTE
Preoperative labs have been ordered  Please have these done within 7 days of your procedure  We will notify of test results once available and provide a letter to your surgeon once I am able to review all of the test results and determine if medical clearance is appropriate

## 2023-08-02 ENCOUNTER — LAB (OUTPATIENT)
Dept: LAB | Facility: LAB | Age: 68
End: 2023-08-02
Payer: COMMERCIAL

## 2023-08-02 DIAGNOSIS — Z00.00 HEALTH MAINTENANCE EXAMINATION: ICD-10-CM

## 2023-08-02 DIAGNOSIS — Z01.818 PRE-OPERATIVE CLEARANCE: ICD-10-CM

## 2023-08-02 LAB
ABO GROUP (TYPE) IN BLOOD: NORMAL
ALANINE AMINOTRANSFERASE (SGPT) (U/L) IN SER/PLAS: 24 U/L (ref 10–52)
ALBUMIN (G/DL) IN SER/PLAS: 4.4 G/DL (ref 3.4–5)
ALKALINE PHOSPHATASE (U/L) IN SER/PLAS: 52 U/L (ref 33–136)
ANION GAP IN SER/PLAS: 11 MMOL/L (ref 10–20)
ANTIBODY SCREEN: NORMAL
APPEARANCE, URINE: CLEAR
ASPARTATE AMINOTRANSFERASE (SGOT) (U/L) IN SER/PLAS: 30 U/L (ref 9–39)
BASOPHILS (10*3/UL) IN BLOOD BY AUTOMATED COUNT: 0.04 X10E9/L (ref 0–0.1)
BASOPHILS/100 LEUKOCYTES IN BLOOD BY AUTOMATED COUNT: 0.6 % (ref 0–2)
BILIRUBIN TOTAL (MG/DL) IN SER/PLAS: 0.6 MG/DL (ref 0–1.2)
BILIRUBIN, URINE: NEGATIVE
BLOOD, URINE: NEGATIVE
CALCIUM (MG/DL) IN SER/PLAS: 8.9 MG/DL (ref 8.6–10.3)
CARBON DIOXIDE, TOTAL (MMOL/L) IN SER/PLAS: 26 MMOL/L (ref 21–32)
CHLORIDE (MMOL/L) IN SER/PLAS: 106 MMOL/L (ref 98–107)
CHOLESTEROL (MG/DL) IN SER/PLAS: 136 MG/DL (ref 0–199)
CHOLESTEROL IN HDL (MG/DL) IN SER/PLAS: 66.2 MG/DL
CHOLESTEROL/HDL RATIO: 2.1
COLOR, URINE: YELLOW
CREATININE (MG/DL) IN SER/PLAS: 0.85 MG/DL (ref 0.5–1.3)
EOSINOPHILS (10*3/UL) IN BLOOD BY AUTOMATED COUNT: 0.22 X10E9/L (ref 0–0.7)
EOSINOPHILS/100 LEUKOCYTES IN BLOOD BY AUTOMATED COUNT: 3.3 % (ref 0–6)
ERYTHROCYTE DISTRIBUTION WIDTH (RATIO) BY AUTOMATED COUNT: 13.2 % (ref 11.5–14.5)
ERYTHROCYTE MEAN CORPUSCULAR HEMOGLOBIN CONCENTRATION (G/DL) BY AUTOMATED: 31.9 G/DL (ref 32–36)
ERYTHROCYTE MEAN CORPUSCULAR VOLUME (FL) BY AUTOMATED COUNT: 98 FL (ref 80–100)
ERYTHROCYTES (10*6/UL) IN BLOOD BY AUTOMATED COUNT: 4.32 X10E12/L (ref 4.5–5.9)
GFR MALE: >90 ML/MIN/1.73M2
GLUCOSE (MG/DL) IN SER/PLAS: 92 MG/DL (ref 74–99)
GLUCOSE, URINE: NEGATIVE MG/DL
HEMATOCRIT (%) IN BLOOD BY AUTOMATED COUNT: 42.3 % (ref 41–52)
HEMOGLOBIN (G/DL) IN BLOOD: 13.5 G/DL (ref 13.5–17.5)
IMMATURE GRANULOCYTES/100 LEUKOCYTES IN BLOOD BY AUTOMATED COUNT: 0.1 % (ref 0–0.9)
INR IN PPP BY COAGULATION ASSAY: 1 (ref 0.9–1.1)
KETONES, URINE: NEGATIVE MG/DL
LDL: 49 MG/DL (ref 0–99)
LEUKOCYTE ESTERASE, URINE: NEGATIVE
LEUKOCYTES (10*3/UL) IN BLOOD BY AUTOMATED COUNT: 6.8 X10E9/L (ref 4.4–11.3)
LYMPHOCYTES (10*3/UL) IN BLOOD BY AUTOMATED COUNT: 1.81 X10E9/L (ref 1.2–4.8)
LYMPHOCYTES/100 LEUKOCYTES IN BLOOD BY AUTOMATED COUNT: 26.8 % (ref 13–44)
MONOCYTES (10*3/UL) IN BLOOD BY AUTOMATED COUNT: 0.54 X10E9/L (ref 0.1–1)
MONOCYTES/100 LEUKOCYTES IN BLOOD BY AUTOMATED COUNT: 8 % (ref 2–10)
NEUTROPHILS (10*3/UL) IN BLOOD BY AUTOMATED COUNT: 4.14 X10E9/L (ref 1.2–7.7)
NEUTROPHILS/100 LEUKOCYTES IN BLOOD BY AUTOMATED COUNT: 61.2 % (ref 40–80)
NITRITE, URINE: NEGATIVE
PH, URINE: 7 (ref 5–8)
PLATELETS (10*3/UL) IN BLOOD AUTOMATED COUNT: 247 X10E9/L (ref 150–450)
POTASSIUM (MMOL/L) IN SER/PLAS: 4.6 MMOL/L (ref 3.5–5.3)
PROTEIN TOTAL: 6.8 G/DL (ref 6.4–8.2)
PROTEIN, URINE: NEGATIVE MG/DL
PROTHROMBIN TIME (PT) IN PPP BY COAGULATION ASSAY: 11.3 SEC (ref 9.8–12.8)
RH FACTOR: NORMAL
SODIUM (MMOL/L) IN SER/PLAS: 138 MMOL/L (ref 136–145)
SPECIFIC GRAVITY, URINE: 1.02 (ref 1–1.03)
TRIGLYCERIDE (MG/DL) IN SER/PLAS: 105 MG/DL (ref 0–149)
UREA NITROGEN (MG/DL) IN SER/PLAS: 22 MG/DL (ref 6–23)
UROBILINOGEN, URINE: <2 MG/DL (ref 0–1.9)
VLDL: 21 MG/DL (ref 0–40)

## 2023-08-02 PROCEDURE — 86900 BLOOD TYPING SEROLOGIC ABO: CPT

## 2023-08-02 PROCEDURE — 80053 COMPREHEN METABOLIC PANEL: CPT

## 2023-08-02 PROCEDURE — 80061 LIPID PANEL: CPT

## 2023-08-02 PROCEDURE — 36415 COLL VENOUS BLD VENIPUNCTURE: CPT

## 2023-08-02 PROCEDURE — 86850 RBC ANTIBODY SCREEN: CPT

## 2023-08-02 PROCEDURE — 86901 BLOOD TYPING SEROLOGIC RH(D): CPT

## 2023-08-02 PROCEDURE — 81003 URINALYSIS AUTO W/O SCOPE: CPT

## 2023-08-02 PROCEDURE — 85025 COMPLETE CBC W/AUTO DIFF WBC: CPT

## 2023-08-02 PROCEDURE — 85610 PROTHROMBIN TIME: CPT

## 2023-08-02 NOTE — RESULT ENCOUNTER NOTE
Blood work is fantastic across-the-board including urinalysis so I am going to write the letter presurgical clearance and send the blood work over to the surgical team

## 2023-08-10 ENCOUNTER — HOSPITAL ENCOUNTER (OUTPATIENT)
Dept: DATA CONVERSION | Facility: HOSPITAL | Age: 68
End: 2023-08-10
Attending: ORTHOPAEDIC SURGERY | Admitting: ORTHOPAEDIC SURGERY
Payer: COMMERCIAL

## 2023-08-10 DIAGNOSIS — S83.242A OTHER TEAR OF MEDIAL MENISCUS, CURRENT INJURY, LEFT KNEE, INITIAL ENCOUNTER: ICD-10-CM

## 2023-09-29 VITALS
TEMPERATURE: 97.9 F | RESPIRATION RATE: 16 BRPM | BODY MASS INDEX: 29.49 KG/M2 | DIASTOLIC BLOOD PRESSURE: 86 MMHG | HEART RATE: 64 BPM | HEIGHT: 69 IN | WEIGHT: 199.08 LBS | SYSTOLIC BLOOD PRESSURE: 136 MMHG

## 2023-09-30 NOTE — H&P
History of Present Illness:   History Present Illness:  Reason for surgery: L knee medial meniscus tear   HPI:    67 year old male with long history of left knee pain that has failed conservative treatment with MRI evidence of a medial meniscus tear.    Allergies:        Allergies:  ·  No Known Allergies :     Home Medication Review:   Home Medications Reviewed: yes     Impression/Procedure:   ·  Impression and Planned Procedure: Left knee scope with medial meniscectomy       ERAS (Enhanced Recovery After Surgery):  ·  ERAS Patient: no       Vital Signs:  Temperature C: 36.6 degrees C   Temperature F: 97.8 degrees F   Heart Rate: 64 beats per minute   Respiratory Rate: 16 breath per minute   Blood Pressure Systolic: 136 mm/Hg   Blood Pressure Diastolic: 86 mm/Hg     Physical Exam by System:    Respiratory/Thorax: Symmetric chest rise, nonlabored   Cardiovascular: RRR on peripheral palpation     Consent:   COVID-19 Consent:  ·  COVID-19 Risk Consent Surgeon has reviewed key risks related to the risk of nathalie COVID-19 and if they contract COVID-19 what the risks are.     Attestation:   Note Completion:  I am a:  Resident/Fellow   Attending Attestation I saw and evaluated the patient.  I personally obtained the key and critical portions of the history and physical exam or was physically present for key and  critical portions performed by the resident/fellow. I reviewed the resident/fellow?s documentation and discussed the patient with the resident/fellow.  I agree with the resident/fellow?s medical decision making as documented in the note.     I personally evaluated the patient on 10-Aug-2023         Electronic Signatures:  Haase, Lucas (MD (Resident))  (Signed 10-Aug-2023 07:20)   Authored: History of Present Illness, Allergies, Home  Medication Review, Impression/Procedure, ERAS, Physical Exam, Consent, Note Completion  Sami Beltrán)  (Signed 10-Aug-2023 08:00)   Authored: Note Completion   Co-Signer:  History of Present Illness, Allergies, Home Medication Review, Impression/Procedure, ERAS, Physical Exam, Consent, Note Completion      Last Updated: 10-Aug-2023 08:00 by Sami Beltrán)

## 2023-10-01 NOTE — OP NOTE
Post Operative Note:     PreOp Diagnosis: Left knee medial meniscus tear,  cartilage damage   Post-Procedure Diagnosis: Same   Procedure: Left knee arthroscopy, partial medial  meniscectomy, microfracture   Surgeon: Jerel   Resident/Fellow/Other Assistant: Rodney HILL   Anesthesia: General plus local   Estimated Blood Loss (mL): Minimal   Specimen: no   Complications: None   Findings: Above   Patient Returned To/Condition: Stable to the recovery  room     Operative Report Dictated:  Dictation: not applicable - note contains Operative  Report   Operative Report:    I, Dr Beltrán, was present and scrubbed for the entire surgical procedure, including wound closure.     CPT CODES:  84983, 41885    LOCATION:  [facility]    INDICATION FOR SURGERY:  67-year-old male with longstanding left knee pain.  We treated the patient's knee pain conservatively with anti-inflammatories, physical therapy, and steroid injections.  The patient failed to have significant relief.  X-rays showed a relatively normal  knee with no significant arthritis and MRI showed a large left medial meniscus tear with some cartilage damage in the knee.  On physical examination, the patient had joint line tenderness with a positive Quinn's test.  We discussed continuing conservative  treatment but this had not been helping and was tried already.  We discussed a knee arthroscopy with partial menisectomy and possible microfracture.  The patient understood all the risks versus benefits of operative and non-operative treatment options.   The risks of knee arthroscopy were discussed, which included but were not limited to: risk of continued pain, risks of infection, bleeding, nerve, artery, or muscle damage, risk of fracture, risk of need for additional surgery, risk of anesthesia including  risks of heart attack, stroke, or even death.  The patient wanted to proceed with surgery and signed appropriate surgical consents.  On the morning of surgery, CLARISA  signed the patient's operative knee the preoperative holding area.      PROCEDURE:  The patient was brought to the operating room and was placed supine on the operating room table.  All of their bony prominences were padded.  A operating room huddle was performed and the patient received IV antibiotics.  SCDs were applied to the non-operative  lower extremity and a non-sterile tournaquet was placed around the operative proximal thigh.   The patient's left lower extremity was prepped and draped in the normal sterile fashion.  A pre-incision timeout was called.  A knee arthroscopy was then performed,  using the standard anteromedial and anterolateral portals.   Throughout the knee arthroscopy, no loose bodies were identified.     In the patellofemoral joint (patella / trochlea), the cartilage was found to have some grade 2 cartilage changes that were debrided arthroscopic shaver back to stable base.     In the medial compartment, there was a complex medial meniscus tear that was debrided using a combination of arthroscopic alex and biters, debriding the tear back to a stable base.  After this was performed, the remainder of medial meniscus was probed  and found to be stable.  The cartilage of the medial compartment was found to have areas of grade 3 and small areas of grade 4 cartilage damage on the medial femoral condyle.  The damaged cartilage was debrided with arthroscopic shaver back to stable  base then a microfracture was performed to the medial femoral condyle drilling holes 3 to 4 mm deep and 3 to 4 mm apart after this was performed good blood flow seen coming from the microfracture holes in the medial femoral condyle.    In the notch, the ACL and PCL were found to be intact and had good, appropriate tension.      In the lateral compartment, the lateral meniscus was intact.  The cartilage of the lateral compartment was well-maintained.      After the knee arthroscopy was completed, a complete knee  arthroscopy was then again performed and no further pathology was identified.  The knee was drained of fluid and the arthroscopic portals were closed with 3-0 nylon skin sutures.  Adaptic and a  dry sterile dressing was applied.  The patient was awoken and brought to the recovery room in good condition.  There were no complications.             Attestation:   Note Completion:  Attending Attestation I was present for the entire procedure         Electronic Signatures:  Sami Beltrán)  (Signed 10-Aug-2023 17:47)   Authored: Post Operative Note, Note Completion      Last Updated: 10-Aug-2023 17:47 by Sami Beltrán)

## 2023-10-10 DIAGNOSIS — K21.9 GASTROESOPHAGEAL REFLUX DISEASE WITHOUT ESOPHAGITIS: ICD-10-CM

## 2023-10-10 RX ORDER — PANTOPRAZOLE SODIUM 40 MG/1
40 TABLET, DELAYED RELEASE ORAL
Qty: 90 TABLET | Refills: 1 | Status: SHIPPED | OUTPATIENT
Start: 2023-10-10 | End: 2024-04-22 | Stop reason: SDUPTHER

## 2023-10-16 ENCOUNTER — LAB (OUTPATIENT)
Dept: LAB | Facility: LAB | Age: 68
End: 2023-10-16
Payer: COMMERCIAL

## 2023-10-16 DIAGNOSIS — N40.1 BENIGN PROSTATIC HYPERPLASIA WITH LOWER URINARY TRACT SYMPTOMS: Primary | ICD-10-CM

## 2023-10-16 PROCEDURE — 84154 ASSAY OF PSA FREE: CPT

## 2023-10-16 PROCEDURE — 36415 COLL VENOUS BLD VENIPUNCTURE: CPT

## 2023-10-16 PROCEDURE — 84153 ASSAY OF PSA TOTAL: CPT

## 2023-10-19 LAB
PSA FREE MFR SERPL: 17 %
PSA FREE SERPL-MCNC: 0.8 NG/ML
PSA SERPL IA-MCNC: 4.6 NG/ML (ref 0–4)

## 2023-10-24 ENCOUNTER — OFFICE VISIT (OUTPATIENT)
Dept: UROLOGY | Facility: HOSPITAL | Age: 68
End: 2023-10-24
Payer: COMMERCIAL

## 2023-10-24 DIAGNOSIS — R97.20 ELEVATED PSA: Primary | ICD-10-CM

## 2023-10-24 DIAGNOSIS — R35.1 BENIGN PROSTATIC HYPERPLASIA WITH NOCTURIA: ICD-10-CM

## 2023-10-24 DIAGNOSIS — R10.9 FLANK PAIN: ICD-10-CM

## 2023-10-24 DIAGNOSIS — N40.1 BENIGN PROSTATIC HYPERPLASIA WITH NOCTURIA: ICD-10-CM

## 2023-10-24 PROCEDURE — 99214 OFFICE O/P EST MOD 30 MIN: CPT | Performed by: UROLOGY

## 2023-10-24 PROCEDURE — 1126F AMNT PAIN NOTED NONE PRSNT: CPT | Performed by: UROLOGY

## 2023-10-24 PROCEDURE — 1036F TOBACCO NON-USER: CPT | Performed by: UROLOGY

## 2023-10-24 PROCEDURE — 1159F MED LIST DOCD IN RCRD: CPT | Performed by: UROLOGY

## 2023-10-24 ASSESSMENT — PAIN SCALES - GENERAL: PAINLEVEL: 0-NO PAIN

## 2023-10-24 NOTE — PROGRESS NOTES
FUV    Last seen - 4/25/23     HISTORY OF PRESENT ILLNESS:   Bal Rosado is a 67 y.o. male with h/o HTN, HLD, ED, BPH/LUTS, elevated PSA who is being seen today for fuv    1) Elevated PSA  -Most recent PSA 4.6 ng/ml (17% free) on 10/16.23, stable  -MRI prostate Dec 2019 - 50 g gland, 1cm left apical peripheral zone PIRADS 4 with possible EPE.   -Prostate biopsy on 01/09/20; not fusion. 46.4cc prostate. All cores negative for cancer.   -Prostate fusion biopsy on 12/07/21. All cores negative for cancer.      2) LUTS  -Continues to have issues with weak stream, nocturia  -No real improvement with daily cialis or flomax  -Cystoscopy showed obstructive prostate, 1+ trabeculations in bladder  -Uroflow Qmax 11.1ml/s, blunted and prolonged flow. PVR 29cc  -Working on conservative measures, not interested in surgical treatment at this time    PAST MEDICAL HISTORY:  Past Medical History:   Diagnosis Date    Cough, unspecified 11/23/2015    Cough    Essential (primary) hypertension 08/31/2022    Benign essential hypertension    Gastro-esophageal reflux disease without esophagitis 06/29/2022    Esophageal reflux    Personal history of diseases of the skin and subcutaneous tissue 03/09/2016    History of rosacea    Personal history of other drug therapy 09/24/2019    History of influenza vaccination    Personal history of pneumonia (recurrent) 11/23/2015    History of bacterial pneumonia       PAST SURGICAL HISTORY:  Past Surgical History:   Procedure Laterality Date    HERNIA REPAIR  01/20/2014    Hernia Repair    KNEE SURGERY  01/20/2014    Knee Surgery        ALLERGIES:   No Known Allergies     MEDICATIONS:   Current Outpatient Medications   Medication Instructions    acetaminophen (Tylenol) 500 mg tablet TAKE 2 TABLETS BY MOUTH EVERY 8 HOURS FOR 5 DAYS, AFTER 5 DAYS TAKE 2 TABLETS BY MOUTH EVERY 8 HOURS ONLY AS NEEDED FOR PAIN    aspirin 81 mg EC tablet 1 tablet, oral, Daily    atorvastatin (LIPITOR) 80 mg, oral, Daily     budesonide-formoteroL (Symbicort) 160-4.5 mcg/actuation inhaler 2 puffs, inhalation, 2 times daily RT, Rinse mouth after use.    docusate sodium (Colace) 100 mg capsule TAKE 1 CAPSULE BY MOUTH TWO TIMES A DAY, AS NEEDED FOR CONSTIPATION WHILE TAKING PAIN MEDICATIONS    ergocalciferol (Vitamin D-2) 1.25 MG (14507 UT) capsule 1 capsule, oral, Weekly    furosemide (LASIX) 40 mg, oral, Daily    gabapentin (Neurontin) 300 mg capsule TAKE 1 CAPSULE BY MOUTH AT BEDTIME FOR ONLY 5 DAYS AFTER SURGERY- DO NOT TAKE WITHIN 3 HOURS OF TAKING OXYCODONE    lisinopril 5 mg, oral, Daily    meloxicam (Mobic) 15 mg tablet TAKE 1 TABLET BY MOUTH DAILY FOR 5 DAYS, AFTER 5 DAYS TAKE 1 TABLET BY MOUTH DAILY ONLY AS NEEDED FOR PAIN    naproxen (NAPROSYN) 500 mg, oral, Every 12 hours PRN    oxyCODONE (Roxicodone) 5 mg immediate release tablet TAKE 1 TABLET BY MOUTH EVERY 6 HOURS, ONLY TO BE TAKEN FOR SEVERE PAIN- DO NOT TAKE WITHIN 3 HOURS OF TAKING GABAPENTIN    pantoprazole (PROTONIX) 40 mg, oral, Daily before breakfast, Take 30 minutes before breakfast    sildenafil (Viagra) 100 mg tablet oral    tadalafil (CIALIS) 10 mg, oral, TAKE 1 TABLET 1 HOUR BEFORE ACTIVITY EVERY 36 HRS PRN    tamsulosin (FLOMAX) 0.4 mg, oral, Daily    triamcinolone (Kenalog) 0.1 % cream Topical, Apply 2 to 3 times daily to affected area(s)        PHYSICAL EXAM:  There were no vitals taken for this visit.  Constitutional: Patient appears well-developed and well-nourished. No distress.    Pulmonary/Chest: Effort normal. No respiratory distress.   Musculoskeletal: Normal range of motion.    Neurological: Alert and oriented to person, place, and time.  Psychiatric: Normal mood and affect. Behavior is normal. Thought content normal.      Labs  Lab Results   Component Value Date    PSA 4.6 (H) 10/16/2023     Lab Results   Component Value Date    GFRMALE >90 08/02/2023     Lab Results   Component Value Date    CREATININE 0.85 08/02/2023     Lab Results   Component Value  Date    CHOL 136 08/02/2023     Lab Results   Component Value Date    HDL 66.2 08/02/2023     Lab Results   Component Value Date    CHHDL 2.1 08/02/2023     Lab Results   Component Value Date    LDLF 49 08/02/2023     Lab Results   Component Value Date    VLDL 21 08/02/2023     Lab Results   Component Value Date    TRIG 105 08/02/2023     Lab Results   Component Value Date    HCT 42.3 08/02/2023       Imaging    Procedures      Assessment:      1. Elevated PSA  PSA, total and free      2. Flank pain  US renal complete      3. Benign prostatic hyperplasia with nocturia            Bal Rosado is a 67 y.o. male here for FUV     Plan:   1) Elevated PSA  -Most recent PSA 4.6 ng/ml (17% free) on 10/16.23, stable  -MRI prostate Dec 2019 - 50 g gland, 1cm left apical peripheral zone PIRADS 4 with possible EPE.   -Prostate biopsy on 01/09/20; not fusion. 46.4cc prostate. All cores negative for cancer.   -Prostate fusion biopsy on 12/07/21. All cores negative for cancer.   -Plan to recheck PSA in 1yr     2) LUTS  -Continues to have issues with weak stream, nocturia  -No real improvement with daily cialis or flomax  -Cystoscopy showed obstructive prostate, 1+ trabeculations in bladder  -Uroflow Qmax 11.1ml/s, blunted and prolonged flow. PVR 29cc  -Working on conservative measures, not interested in surgical treatment at this time    3) Right flank pain   -Sounds MS in nature but will get toño to r/o

## 2023-10-27 ENCOUNTER — APPOINTMENT (OUTPATIENT)
Dept: RADIOLOGY | Facility: CLINIC | Age: 68
End: 2023-10-27
Payer: COMMERCIAL

## 2023-10-27 ENCOUNTER — HOSPITAL ENCOUNTER (OUTPATIENT)
Dept: RADIOLOGY | Facility: HOSPITAL | Age: 68
Discharge: HOME | End: 2023-10-27
Payer: COMMERCIAL

## 2023-10-27 DIAGNOSIS — R10.9 FLANK PAIN: ICD-10-CM

## 2023-10-27 PROCEDURE — 76770 US EXAM ABDO BACK WALL COMP: CPT | Performed by: STUDENT IN AN ORGANIZED HEALTH CARE EDUCATION/TRAINING PROGRAM

## 2023-10-27 PROCEDURE — 76770 US EXAM ABDO BACK WALL COMP: CPT

## 2023-12-05 ENCOUNTER — TELEPHONE (OUTPATIENT)
Dept: PRIMARY CARE | Facility: CLINIC | Age: 68
End: 2023-12-05
Payer: COMMERCIAL

## 2023-12-05 DIAGNOSIS — N52.9 MALE ERECTILE DISORDER: Primary | ICD-10-CM

## 2023-12-05 RX ORDER — SILDENAFIL 100 MG/1
100 TABLET, FILM COATED ORAL AS NEEDED
Qty: 30 TABLET | Refills: 2 | Status: SHIPPED | OUTPATIENT
Start: 2023-12-05

## 2023-12-05 RX ORDER — TADALAFIL 5 MG/1
5 TABLET ORAL DAILY
Qty: 90 TABLET | Refills: 1 | Status: SHIPPED | OUTPATIENT
Start: 2023-12-05 | End: 2024-05-28 | Stop reason: SDUPTHER

## 2023-12-05 NOTE — TELEPHONE ENCOUNTER
Pt called and states he needs a refill of daily Cialis at 3mg and a fill of 20mg Cialis for ED    Is that something we do?    Pharm - [facility]

## 2023-12-22 ENCOUNTER — OFFICE VISIT (OUTPATIENT)
Dept: PRIMARY CARE | Facility: CLINIC | Age: 68
End: 2023-12-22
Payer: COMMERCIAL

## 2023-12-22 VITALS
DIASTOLIC BLOOD PRESSURE: 80 MMHG | SYSTOLIC BLOOD PRESSURE: 124 MMHG | WEIGHT: 204 LBS | BODY MASS INDEX: 30.15 KG/M2 | HEART RATE: 76 BPM

## 2023-12-22 DIAGNOSIS — Z23 NEED FOR SHINGLES VACCINE: ICD-10-CM

## 2023-12-22 DIAGNOSIS — I10 ESSENTIAL (PRIMARY) HYPERTENSION: Primary | ICD-10-CM

## 2023-12-22 DIAGNOSIS — E78.2 MIXED HYPERLIPIDEMIA: ICD-10-CM

## 2023-12-22 PROBLEM — S83.249A MEDIAL MENISCUS TEAR: Status: ACTIVE | Noted: 2023-12-22

## 2023-12-22 PROBLEM — Z98.890 S/P ARTHROSCOPIC PARTIAL MEDIAL MENISCECTOMY: Status: ACTIVE | Noted: 2023-12-22

## 2023-12-22 PROCEDURE — 99213 OFFICE O/P EST LOW 20 MIN: CPT | Performed by: FAMILY MEDICINE

## 2023-12-22 PROCEDURE — 1036F TOBACCO NON-USER: CPT | Performed by: FAMILY MEDICINE

## 2023-12-22 PROCEDURE — 1126F AMNT PAIN NOTED NONE PRSNT: CPT | Performed by: FAMILY MEDICINE

## 2023-12-22 PROCEDURE — 1159F MED LIST DOCD IN RCRD: CPT | Performed by: FAMILY MEDICINE

## 2023-12-22 PROCEDURE — 3079F DIAST BP 80-89 MM HG: CPT | Performed by: FAMILY MEDICINE

## 2023-12-22 PROCEDURE — 3074F SYST BP LT 130 MM HG: CPT | Performed by: FAMILY MEDICINE

## 2023-12-22 RX ORDER — LISINOPRIL 5 MG/1
5 TABLET ORAL DAILY
Qty: 90 TABLET | Refills: 1 | Status: SHIPPED | OUTPATIENT
Start: 2023-12-22 | End: 2024-06-19

## 2023-12-22 RX ORDER — ATORVASTATIN CALCIUM 80 MG/1
80 TABLET, FILM COATED ORAL DAILY
Qty: 90 TABLET | Refills: 1 | Status: SHIPPED | OUTPATIENT
Start: 2023-12-22 | End: 2024-06-19

## 2023-12-22 NOTE — PROGRESS NOTES
Subjective   Patient ID: Bal Rosado is a 68 y.o. male who presents for Hypertension and Hyperlipidemia.    1.  Meniscal tear.  Left-sided medial meniscal tear, severe arthritic changes with findings concerning for subchondral insufficiency fracture in the medial femoral condyle  Arthroscopic meniscectomy with Dr. Beltrán on 8/10/2023  Does not think the surgery helped, but he was told it may not    2.  Hypertension.  Controlled, 124/82 in office today  Taking lisinopril 5 mg daily, tolerates well without side effects, requesting refill  Denies cardiopulmonary symptoms    3.  Hyperlipidemia.  Lipid panels have been WNL for few years now, last done in August 2023  Taking atorvastatin 80 mg daily, tolerates well, requesting refill    4.  Shingles vaccine.  Shingles and pneumonia vaccines were declined at past visits  Interested in considering Shingrix, but not today because of the Christmas holiday    5.  BPH.  Following urology, Dr. Mays, due to elevated PSA levels from 2 years prior  Negative prostate biopsies  He will continue to follow with him on an annual basis  Last PSA 4.6, up from 4.5    6.  Erectile dysfunction.  Currently using Cialis 5mg daily and Viagra 100mg PRN   Stable at this time, not requesting medication refills    Review of Systems  All pertinent positive symptoms are included in the history of present illness.    All other systems have been reviewed and are negative and noncontributory to this patient's current ailments.    Current Outpatient Medications   Medication Instructions    atorvastatin (LIPITOR) 80 mg, oral, Daily    lisinopril 5 mg, oral, Daily    pantoprazole (PROTONIX) 40 mg, oral, Daily before breakfast, Take 30 minutes before breakfast    sildenafil (VIAGRA) 100 mg, oral, As needed    tadalafil (CIALIS) 5 mg, oral, Daily     No Known Allergies    Immunization History   Administered Date(s) Administered    Influenza, seasonal, injectable 10/08/2020    Pfizer Purple Cap SARS-CoV-2  03/31/2021, 04/17/2021, 12/27/2021    Pneumococcal conjugate vaccine, 13-valent (PREVNAR 13) 03/14/2017    Pneumococcal polysaccharide vaccine, 23-valent, age 2 years and older (PNEUMOVAX 23) 03/09/2016, 08/10/2021    SARS-CoV-2, Unspecified 08/02/2022    Td vaccine, age 7 years and older (TDVAX) 07/01/2018    Tdap vaccine, age 7 year and older (BOOSTRIX) 07/14/2011, 07/01/2017     Past Surgical History:   Procedure Laterality Date    HERNIA REPAIR  01/20/2014    Hernia Repair    KNEE SURGERY  01/20/2014    Knee Surgery     Family History   Problem Relation Name Age of Onset    Gaucher's disease Daughter      Gaucher's disease Son      Lung cancer Other      Leukemia Other       Social History     Tobacco Use    Smoking status: Never    Smokeless tobacco: Never   Substance Use Topics    Alcohol use: Yes     Comment: Socially    Drug use: Never       Objective   Visit Vitals  /80   Pulse 76   Wt 92.5 kg (204 lb)   BMI 30.15 kg/m²   Smoking Status Never   BSA 2.12 m²       Physical Exam  CONSTITUTIONAL - well nourished, well developed, looks like stated age, in no acute distress, not ill-appearing, and not tired appearing  SKIN - normal skin color and pigmentation, normal skin turgor without rash, lesions, or nodules visualized  HEAD - no trauma, normocephalic  CHEST - clear to auscultation, no wheezing, no crackles and no rales, good effort  CARDIAC - bradycardic rate and regular rhythm, no skipped beats, no murmur  EXTREMITIES - no edema, no deformities  NEUROLOGICAL - normal gait, normal balance, normal motor  PSYCHIATRIC - alert, pleasant and cordial, age-appropriate     Assessment/Plan   Problem List Items Addressed This Visit       Hyperlipidemia     Blood work has looked excellent for the past several years, most recently in August  We will bypass blood work today, refill medication for 6 months         Relevant Medications    atorvastatin (Lipitor) 80 mg tablet    Essential (primary) hypertension -  Primary     Stable, no changes to medication recommended  I would like to have you monitor and record blood pressures at home   Blood pressure goal should be below 130/80, ideally 120/80  If the blood pressure is too high or too low, we need to consider making adjustments to your antihypertensive therapy         Relevant Medications    lisinopril 5 mg tablet     Other Visit Diagnoses       Need for shingles vaccine        I looked up your insurance, Shingrix would not be covered in office, so if interested I would suggest pursue injection from your local pharmacy

## 2023-12-22 NOTE — ASSESSMENT & PLAN NOTE
Blood work has looked excellent for the past several years, most recently in August  We will bypass blood work today, refill medication for 6 months

## 2024-04-22 DIAGNOSIS — K21.9 GASTROESOPHAGEAL REFLUX DISEASE WITHOUT ESOPHAGITIS: ICD-10-CM

## 2024-04-22 RX ORDER — PANTOPRAZOLE SODIUM 40 MG/1
40 TABLET, DELAYED RELEASE ORAL
Qty: 90 TABLET | Refills: 1 | Status: SHIPPED | OUTPATIENT
Start: 2024-04-22 | End: 2024-10-19

## 2024-05-28 DIAGNOSIS — N52.9 MALE ERECTILE DISORDER: ICD-10-CM

## 2024-05-28 RX ORDER — TADALAFIL 5 MG/1
5 TABLET ORAL DAILY
Qty: 30 TABLET | Refills: 0 | Status: SHIPPED | OUTPATIENT
Start: 2024-05-28 | End: 2024-06-27

## 2024-06-24 ENCOUNTER — APPOINTMENT (OUTPATIENT)
Dept: PRIMARY CARE | Facility: CLINIC | Age: 69
End: 2024-06-24
Payer: COMMERCIAL

## 2024-06-24 ENCOUNTER — LAB (OUTPATIENT)
Dept: LAB | Facility: LAB | Age: 69
End: 2024-06-24
Payer: COMMERCIAL

## 2024-06-24 VITALS
HEART RATE: 68 BPM | BODY MASS INDEX: 29.47 KG/M2 | DIASTOLIC BLOOD PRESSURE: 70 MMHG | SYSTOLIC BLOOD PRESSURE: 124 MMHG | HEIGHT: 69 IN | WEIGHT: 199 LBS

## 2024-06-24 DIAGNOSIS — E78.2 MIXED HYPERLIPIDEMIA: ICD-10-CM

## 2024-06-24 DIAGNOSIS — Z23 NEED FOR PNEUMOCOCCAL 20-VALENT CONJUGATE VACCINATION: ICD-10-CM

## 2024-06-24 DIAGNOSIS — R97.20 ELEVATED PSA: ICD-10-CM

## 2024-06-24 DIAGNOSIS — Z23 NEED FOR SHINGLES VACCINE: ICD-10-CM

## 2024-06-24 DIAGNOSIS — E55.9 VITAMIN D DEFICIENCY: ICD-10-CM

## 2024-06-24 DIAGNOSIS — I10 ESSENTIAL (PRIMARY) HYPERTENSION: ICD-10-CM

## 2024-06-24 DIAGNOSIS — K21.9 GASTROESOPHAGEAL REFLUX DISEASE WITHOUT ESOPHAGITIS: ICD-10-CM

## 2024-06-24 DIAGNOSIS — G62.9 NEUROPATHY: ICD-10-CM

## 2024-06-24 DIAGNOSIS — N52.9 MALE ERECTILE DISORDER: ICD-10-CM

## 2024-06-24 DIAGNOSIS — Z00.00 PHYSICAL EXAM, ANNUAL: Primary | ICD-10-CM

## 2024-06-24 PROBLEM — Z01.818 PRE-OPERATIVE CLEARANCE: Status: RESOLVED | Noted: 2023-06-28 | Resolved: 2024-06-24

## 2024-06-24 PROBLEM — S83.249A MEDIAL MENISCUS TEAR: Status: RESOLVED | Noted: 2023-12-22 | Resolved: 2024-06-24

## 2024-06-24 PROBLEM — R29.898 LEG WEAKNESS: Status: RESOLVED | Noted: 2023-06-06 | Resolved: 2024-06-24

## 2024-06-24 PROBLEM — S83.242A ACUTE MEDIAL MENISCUS TEAR OF LEFT KNEE: Status: RESOLVED | Noted: 2023-06-28 | Resolved: 2024-06-24

## 2024-06-24 PROBLEM — M25.551 RIGHT HIP PAIN: Status: RESOLVED | Noted: 2023-03-13 | Resolved: 2024-06-24

## 2024-06-24 PROBLEM — M70.21 OLECRANON BURSITIS OF RIGHT ELBOW: Status: RESOLVED | Noted: 2023-03-06 | Resolved: 2024-06-24

## 2024-06-24 PROBLEM — R53.83 FATIGUE: Status: RESOLVED | Noted: 2023-03-06 | Resolved: 2024-06-24

## 2024-06-24 PROBLEM — J45.909 REACTIVE AIRWAY DISEASE (HHS-HCC): Status: RESOLVED | Noted: 2023-03-06 | Resolved: 2024-06-24

## 2024-06-24 PROBLEM — M25.562 LEFT KNEE PAIN: Status: RESOLVED | Noted: 2023-03-06 | Resolved: 2024-06-24

## 2024-06-24 PROBLEM — G44.89 OTHER HEADACHE SYNDROME: Status: RESOLVED | Noted: 2023-06-06 | Resolved: 2024-06-24

## 2024-06-24 LAB
25(OH)D3 SERPL-MCNC: 28 NG/ML (ref 30–100)
ALBUMIN SERPL BCP-MCNC: 4.3 G/DL (ref 3.4–5)
ALP SERPL-CCNC: 44 U/L (ref 33–136)
ALT SERPL W P-5'-P-CCNC: 17 U/L (ref 10–52)
ANION GAP SERPL CALC-SCNC: 11 MMOL/L (ref 10–20)
AST SERPL W P-5'-P-CCNC: 28 U/L (ref 9–39)
BILIRUB SERPL-MCNC: 1.1 MG/DL (ref 0–1.2)
BUN SERPL-MCNC: 14 MG/DL (ref 6–23)
CALCIUM SERPL-MCNC: 9.1 MG/DL (ref 8.6–10.3)
CHLORIDE SERPL-SCNC: 105 MMOL/L (ref 98–107)
CHOLEST SERPL-MCNC: 134 MG/DL (ref 0–199)
CHOLESTEROL/HDL RATIO: 2
CO2 SERPL-SCNC: 25 MMOL/L (ref 21–32)
CREAT SERPL-MCNC: 0.74 MG/DL (ref 0.5–1.3)
EGFRCR SERPLBLD CKD-EPI 2021: >90 ML/MIN/1.73M*2
GLUCOSE SERPL-MCNC: 92 MG/DL (ref 74–99)
HDLC SERPL-MCNC: 66 MG/DL
LDLC SERPL CALC-MCNC: 57 MG/DL
NON HDL CHOLESTEROL: 68 MG/DL (ref 0–149)
POTASSIUM SERPL-SCNC: 4.1 MMOL/L (ref 3.5–5.3)
PROT SERPL-MCNC: 6.5 G/DL (ref 6.4–8.2)
SODIUM SERPL-SCNC: 137 MMOL/L (ref 136–145)
TRIGL SERPL-MCNC: 54 MG/DL (ref 0–149)
VLDL: 11 MG/DL (ref 0–40)

## 2024-06-24 PROCEDURE — 80061 LIPID PANEL: CPT

## 2024-06-24 PROCEDURE — 99397 PER PM REEVAL EST PAT 65+ YR: CPT | Performed by: FAMILY MEDICINE

## 2024-06-24 PROCEDURE — 1159F MED LIST DOCD IN RCRD: CPT | Performed by: FAMILY MEDICINE

## 2024-06-24 PROCEDURE — 3078F DIAST BP <80 MM HG: CPT | Performed by: FAMILY MEDICINE

## 2024-06-24 PROCEDURE — 36415 COLL VENOUS BLD VENIPUNCTURE: CPT

## 2024-06-24 PROCEDURE — 1160F RVW MEDS BY RX/DR IN RCRD: CPT | Performed by: FAMILY MEDICINE

## 2024-06-24 PROCEDURE — 90677 PCV20 VACCINE IM: CPT | Performed by: FAMILY MEDICINE

## 2024-06-24 PROCEDURE — 90750 HZV VACC RECOMBINANT IM: CPT | Performed by: FAMILY MEDICINE

## 2024-06-24 PROCEDURE — 1036F TOBACCO NON-USER: CPT | Performed by: FAMILY MEDICINE

## 2024-06-24 PROCEDURE — 99214 OFFICE O/P EST MOD 30 MIN: CPT | Performed by: FAMILY MEDICINE

## 2024-06-24 PROCEDURE — 3074F SYST BP LT 130 MM HG: CPT | Performed by: FAMILY MEDICINE

## 2024-06-24 PROCEDURE — 90472 IMMUNIZATION ADMIN EACH ADD: CPT | Performed by: FAMILY MEDICINE

## 2024-06-24 PROCEDURE — 90471 IMMUNIZATION ADMIN: CPT | Performed by: FAMILY MEDICINE

## 2024-06-24 PROCEDURE — 82306 VITAMIN D 25 HYDROXY: CPT

## 2024-06-24 PROCEDURE — 80053 COMPREHEN METABOLIC PANEL: CPT

## 2024-06-24 RX ORDER — ATORVASTATIN CALCIUM 80 MG/1
80 TABLET, FILM COATED ORAL DAILY
Qty: 90 TABLET | Refills: 1 | Status: SHIPPED | OUTPATIENT
Start: 2024-06-24 | End: 2024-12-21

## 2024-06-24 RX ORDER — LISINOPRIL 5 MG/1
5 TABLET ORAL DAILY
Qty: 90 TABLET | Refills: 1 | Status: SHIPPED | OUTPATIENT
Start: 2024-06-24 | End: 2024-12-21

## 2024-06-24 RX ORDER — PANTOPRAZOLE SODIUM 40 MG/1
40 TABLET, DELAYED RELEASE ORAL
Qty: 90 TABLET | Refills: 1 | Status: SHIPPED | OUTPATIENT
Start: 2024-06-24 | End: 2024-12-21

## 2024-06-24 RX ORDER — TADALAFIL 5 MG/1
5 TABLET ORAL DAILY
Qty: 30 TABLET | Refills: 0 | Status: SHIPPED | OUTPATIENT
Start: 2024-06-24 | End: 2024-07-24

## 2024-06-24 NOTE — RESULT ENCOUNTER NOTE
Vitamin D is a bit low at 28 so I would recommend either considering a prescription vitamin D at 50,000 IU weekly or OTC daily 2000 IU  Sugar, kidney, liver, electrolytes and cholesterol look excellent across-the-board    No changes to medication recommended  I have sent refills to your pharmacy until December 21

## 2024-06-24 NOTE — ASSESSMENT & PLAN NOTE
Blood work has looked excellent for the past several years, most recently in August  Please obtain fasting blood work this morning and continue medication as prescribed

## 2024-06-24 NOTE — ASSESSMENT & PLAN NOTE
If the shoe inserts do not provide any benefit, we can consider a second opinion from a different podiatrist

## 2024-06-24 NOTE — ASSESSMENT & PLAN NOTE
At the time this office note was being completed, the vitamin D was noted to be a bit low at 28 so I would recommend either considering 50,000 IU weekly, or daily vitamin D at 2000 IU

## 2024-06-24 NOTE — PROGRESS NOTES
Subjective   Reason for Visit: Bal Rosado is an 68 y.o. male here for a Benign Prostatic Hypertrophy, Hyperlipidemia, Hypertension, and GERD.     Past Medical, Surgical, and Family History reviewed and updated in chart.    Reviewed all medications by prescribing practitioner or clinical pharmacist (such as prescriptions, OTCs, herbal therapies and supplements) and documented in the medical record.    HPI  1.  Physical exam.  Colonoscopy: last done February 2017 with 10 year clearance  Immunizations: Tdap 2017, interested in shingles and pneumonia vaccine updates    2.  Hypertension.  Taking lisinopril 5 mg daily, tolerates well without side effects, requesting refill  Denies ACS symptoms    3.  Hyperlipidemia.  Taking atorvastatin 80 mg daily, tolerates well side effects, requesting refill  CT cardiac score never done, willing to have done    4.  Neuropathy.  Constant numbness in both feet  Seen by podiatry who suggested bunion surgery, but he was not interested in pursuing  Thinking about going to a foot store that may make him some shoe inserts to see if that will take some of the pressure off both of his flat feet    Review of Systems  All pertinent positive symptoms are included in the history of present illness.    All other systems have been reviewed and are negative and noncontributory to this patient's current ailments.    Past Medical History:   Diagnosis Date    Cough, unspecified 11/23/2015    Cough    Essential (primary) hypertension 08/31/2022    Benign essential hypertension    Gastro-esophageal reflux disease without esophagitis 06/29/2022    Esophageal reflux    Medial meniscus tear 12/22/2023    Personal history of diseases of the skin and subcutaneous tissue 03/09/2016    History of rosacea    Personal history of other drug therapy 09/24/2019    History of influenza vaccination    Personal history of pneumonia (recurrent) 11/23/2015    History of bacterial pneumonia     Past Surgical History:  "  Procedure Laterality Date    HERNIA REPAIR  01/20/2014    Hernia Repair    KNEE SURGERY  01/20/2014    Knee Surgery     Social History     Tobacco Use    Smoking status: Never    Smokeless tobacco: Never   Substance Use Topics    Alcohol use: Yes     Comment: Socially    Drug use: Never     Family History   Problem Relation Name Age of Onset    Gaucher's disease Daughter      Gaucher's disease Son      Lung cancer Other      Leukemia Other       No Known Allergies  Immunization History   Administered Date(s) Administered    Flu vaccine (IIV4), preservative free *Check age/dose* 03/09/2016, 01/19/2017, 09/24/2019    Influenza, seasonal, injectable 12/10/2012, 10/08/2020    Pfizer Purple Cap SARS-CoV-2 03/31/2021, 04/17/2021, 12/27/2021    Pneumococcal conjugate vaccine, 13-valent (PREVNAR 13) 03/14/2017    Pneumococcal conjugate vaccine, 20-valent (PREVNAR 20) 06/24/2024    Pneumococcal polysaccharide vaccine, 23-valent, age 2 years and older (PNEUMOVAX 23) 03/09/2016, 08/10/2021    SARS-CoV-2, Unspecified 08/02/2022    Td vaccine, age 7 years and older (TDVAX) 07/01/2018    Tdap vaccine, age 7 year and older (BOOSTRIX, ADACEL) 07/14/2011, 07/01/2017    Zoster vaccine, recombinant, adult (SHINGRIX) 06/24/2024     Current Outpatient Medications   Medication Instructions    atorvastatin (LIPITOR) 80 mg, oral, Daily    lisinopril 5 mg, oral, Daily    pantoprazole (PROTONIX) 40 mg, oral, Daily before breakfast, Take 30 minutes before breakfast    sildenafil (VIAGRA) 100 mg, oral, As needed    tadalafil (CIALIS) 5 mg, oral, Daily     Objective   Visit Vitals  /70   Pulse 68   Ht 1.75 m (5' 8.9\")   Wt 90.3 kg (199 lb)   BMI 29.47 kg/m²   Smoking Status Never   BSA 2.1 m²      Lab on 06/24/2024   Component Date Value    Vitamin D, 25-Hydroxy, T* 06/24/2024 28 (L)      The 10-year ASCVD risk score (Zhanna AGUILERA, et al., 2019) is: 12.2%    Values used to calculate the score:      Age: 68 years      Sex: Male      Is " Non- : No      Diabetic: No      Tobacco smoker: No      Systolic Blood Pressure: 124 mmHg      Is BP treated: Yes      HDL Cholesterol: 66.2 mg/dL      Total Cholesterol: 136 mg/dL    Physical Exam  CONSTITUTIONAL - well nourished, well developed, looks like stated age, in no acute distress, not ill-appearing, and not tired appearing  SKIN - normal skin color and pigmentation, normal skin turgor without rash, lesions, or nodules visualized  HEAD - no trauma, normocephalic  EYES - pupils are equal and reactive to light, extraocular muscles are intact, and normal external exam  CHEST - clear to auscultation, no wheezing, no crackles and no rales, good effort  CARDIAC - regular rate and regular rhythm, no skipped beats, no murmur  EXTREMITIES - no obvious or evident edema, no obvious or evident deformities  NEUROLOGICAL - normal gait, normal balance, normal motor, no ataxia, alert, oriented and no focal signs  PSYCHIATRIC - alert, pleasant and cordial, age-appropriate    Assessment/Plan   Problem List Items Addressed This Visit       Elevated PSA    Current Assessment & Plan     Continue to follow with urology per protocol         Esophageal reflux    Current Assessment & Plan     Well, no changes to medication recommended         Relevant Medications    pantoprazole (ProtoNix) 40 mg EC tablet    Hyperlipidemia    Current Assessment & Plan     Blood work has looked excellent for the past several years, most recently in August  Please obtain fasting blood work this morning and continue medication as prescribed         Relevant Medications    atorvastatin (Lipitor) 80 mg tablet    Other Relevant Orders    Comprehensive Metabolic Panel    Lipid Panel    CT cardiac scoring wo IV contrast    Male erectile disorder    Current Assessment & Plan     Stable, no changes to medication recommended         Relevant Medications    tadalafil (Cialis) 5 mg tablet    Vitamin D deficiency    Current Assessment &  Plan     At the time this office note was being completed, the vitamin D was noted to be a bit low at 28 so I would recommend either considering 50,000 IU weekly, or daily vitamin D at 2000 IU         Relevant Orders    Vitamin D 25-Hydroxy,Total (for eval of Vitamin D levels) (Completed)    Essential (primary) hypertension    Current Assessment & Plan     Stable, no changes to medication recommended  I would like to have you monitor and record blood pressures at home   Blood pressure goal should be below 130/80, ideally 120/80  If the blood pressure is too high or too low, we need to consider making adjustments to your antihypertensive therapy         Relevant Medications    lisinopril 5 mg tablet    Other Relevant Orders    Comprehensive Metabolic Panel    Lipid Panel    CT cardiac scoring wo IV contrast    Physical exam, annual - Primary    Current Assessment & Plan     Complete history and physical examination was performed  We will notify of test results once available         Neuropathy    Current Assessment & Plan     If the shoe inserts do not provide any benefit, we can consider a second opinion from a different podiatrist          Other Visit Diagnoses       Need for pneumococcal 20-valent conjugate vaccination        All questions were answered and you were counseled on immunization(s) in detail and vaccination was provided    Relevant Orders    Pneumococcal conjugate vaccine, 20-valent (PREVNAR 20) (Completed)    Need for shingles vaccine        RTC 6 months for Shingrix No. 2    Relevant Orders    Zoster vaccine, recombinant, adult (SHINGRIX) (Completed)          Patient Care Team:  Anibal Dumont DO as PCP - General (Family Medicine)

## 2024-07-30 DIAGNOSIS — N52.9 MALE ERECTILE DISORDER: ICD-10-CM

## 2024-07-30 RX ORDER — TADALAFIL 5 MG/1
5 TABLET ORAL DAILY
Qty: 90 TABLET | Refills: 1 | Status: SHIPPED | OUTPATIENT
Start: 2024-07-30 | End: 2025-01-26

## 2024-08-21 ENCOUNTER — HOSPITAL ENCOUNTER (OUTPATIENT)
Dept: RADIOLOGY | Facility: CLINIC | Age: 69
Discharge: HOME | End: 2024-08-21
Payer: COMMERCIAL

## 2024-08-21 DIAGNOSIS — E78.2 MIXED HYPERLIPIDEMIA: ICD-10-CM

## 2024-08-21 DIAGNOSIS — I10 ESSENTIAL (PRIMARY) HYPERTENSION: ICD-10-CM

## 2024-08-21 PROCEDURE — 75571 CT HRT W/O DYE W/CA TEST: CPT

## 2024-08-23 DIAGNOSIS — E78.2 MIXED HYPERLIPIDEMIA: ICD-10-CM

## 2024-08-23 DIAGNOSIS — I25.10 CORONARY ARTERY CALCIFICATION SEEN ON CAT SCAN: Primary | ICD-10-CM

## 2024-08-23 NOTE — RESULT ENCOUNTER NOTE
CT cardiac score nearly 412 which means that you have obvious coronary artery plaque.  As a general rule of thumb, when the coronary artery plaque reaches that plateau I usually recommend a cardiac consult to determine if any further testing evaluation is warranted such as a stress test, so I am going to make that recommendation.  I placed a referral for you to be seen by my cardiologist, Dr. Duncan, in [place] for consultation

## 2024-08-28 ENCOUNTER — OFFICE VISIT (OUTPATIENT)
Dept: CARDIOLOGY | Facility: CLINIC | Age: 69
End: 2024-08-28
Payer: COMMERCIAL

## 2024-08-28 VITALS
WEIGHT: 197 LBS | DIASTOLIC BLOOD PRESSURE: 61 MMHG | SYSTOLIC BLOOD PRESSURE: 109 MMHG | BODY MASS INDEX: 29.18 KG/M2 | HEART RATE: 80 BPM

## 2024-08-28 DIAGNOSIS — I25.10 CORONARY ARTERY CALCIFICATION SEEN ON CAT SCAN: ICD-10-CM

## 2024-08-28 DIAGNOSIS — I25.10 CORONARY ARTERY DISEASE INVOLVING NATIVE CORONARY ARTERY OF NATIVE HEART WITHOUT ANGINA PECTORIS: Primary | ICD-10-CM

## 2024-08-28 DIAGNOSIS — E78.2 MIXED HYPERLIPIDEMIA: ICD-10-CM

## 2024-08-28 DIAGNOSIS — I10 ESSENTIAL (PRIMARY) HYPERTENSION: ICD-10-CM

## 2024-08-28 PROCEDURE — 99214 OFFICE O/P EST MOD 30 MIN: CPT | Performed by: INTERNAL MEDICINE

## 2024-08-28 PROCEDURE — 3074F SYST BP LT 130 MM HG: CPT | Performed by: INTERNAL MEDICINE

## 2024-08-28 PROCEDURE — 1036F TOBACCO NON-USER: CPT | Performed by: INTERNAL MEDICINE

## 2024-08-28 PROCEDURE — 3078F DIAST BP <80 MM HG: CPT | Performed by: INTERNAL MEDICINE

## 2024-08-28 PROCEDURE — 1160F RVW MEDS BY RX/DR IN RCRD: CPT | Performed by: INTERNAL MEDICINE

## 2024-08-28 PROCEDURE — 1159F MED LIST DOCD IN RCRD: CPT | Performed by: INTERNAL MEDICINE

## 2024-08-28 RX ORDER — ASPIRIN 81 MG/1
81 TABLET ORAL DAILY
COMMUNITY

## 2024-08-28 NOTE — PROGRESS NOTES
Chief Complaint:   Coronary Artery Disease     History of Present Illness     Bal Rosado is a 68 y.o. male presenting with for follow-up of preclinical coronary artery disease detected by coronary artery calcium scoring (XFD=080 on 8/21/24).   The patient is tolerating guideline-directed medical therapy with antiplatelet and statin medication and is compliant.  The patient exercises regularly and follows a heart healthy diet.  The patient has been well since their last office appointment and is not having any anginal symptoms or dyspnea on exertion. Non-smoker.  No DM.     Review of Systems  All pertinent systems have been reviewed and are negative except for what is stated in the history of present illness.    All other systems have been reviewed and are negative and noncontributory to this patient's current ailments.   .       Previous History     Past Medical History:  He has a past medical history of Coronary artery disease involving native coronary artery of native heart without angina pectoris (08/28/2024), Cough, unspecified (11/23/2015), Essential (primary) hypertension (08/31/2022), Gastro-esophageal reflux disease without esophagitis (06/29/2022), Medial meniscus tear (12/22/2023), Personal history of diseases of the skin and subcutaneous tissue (03/09/2016), Personal history of other drug therapy (09/24/2019), and Personal history of pneumonia (recurrent) (11/23/2015).    Past Surgical History:  He has a past surgical history that includes Hernia repair (01/20/2014) and Knee surgery (01/20/2014).      Social History:  He reports that he has never smoked. He has never used smokeless tobacco. He reports current alcohol use. He reports that he does not use drugs.    Family History:  Family History   Problem Relation Name Age of Onset    Gaucher's disease Daughter      Gaucher's disease Son      Lung cancer Other      Leukemia Other          Allergies:  Patient has no known allergies.    Outpatient  Medications:  Current Outpatient Medications   Medication Instructions    aspirin 81 mg, oral, Daily    atorvastatin (LIPITOR) 80 mg, oral, Daily    lisinopril 5 mg, oral, Daily    pantoprazole (PROTONIX) 40 mg, oral, Daily before breakfast, Take 30 minutes before breakfast    sildenafil (VIAGRA) 100 mg, oral, As needed    tadalafil (CIALIS) 5 mg, oral, Daily       Physical Examination   Vitals:  Visit Vitals  /61 (BP Location: Right arm, Patient Position: Sitting, BP Cuff Size: Adult)   Pulse 80   Wt 89.4 kg (197 lb)   BMI 29.18 kg/m²   Smoking Status Never   BSA 2.08 m²    Physical Exam  Vitals reviewed.   Constitutional:       General: He is not in acute distress.     Appearance: Normal appearance.   HENT:      Head: Normocephalic and atraumatic.      Nose: Nose normal.   Eyes:      Conjunctiva/sclera: Conjunctivae normal.   Cardiovascular:      Rate and Rhythm: Normal rate and regular rhythm.      Pulses: Normal pulses.      Heart sounds: No murmur heard.  Pulmonary:      Effort: Pulmonary effort is normal. No respiratory distress.      Breath sounds: Normal breath sounds. No wheezing, rhonchi or rales.   Abdominal:      General: Bowel sounds are normal. There is no distension.      Palpations: Abdomen is soft.      Tenderness: There is no abdominal tenderness.   Musculoskeletal:         General: No swelling.      Right lower leg: No edema.      Left lower leg: No edema.   Skin:     General: Skin is warm and dry.      Capillary Refill: Capillary refill takes less than 2 seconds.   Neurological:      General: No focal deficit present.      Mental Status: He is alert.   Psychiatric:         Mood and Affect: Mood normal.              Labs/Imaging/Cardiac Studies     Last Labs:  CBC -  Lab Results   Component Value Date    WBC 6.8 08/02/2023    HGB 13.5 08/02/2023    HCT 42.3 08/02/2023    MCV 98 08/02/2023     08/02/2023       CMP -  Lab Results   Component Value Date    CALCIUM 9.1 06/24/2024    PROT  "6.5 06/24/2024    ALBUMIN 4.3 06/24/2024    AST 28 06/24/2024    ALT 17 06/24/2024    ALKPHOS 44 06/24/2024    BILITOT 1.1 06/24/2024       LIPID PANEL -   Lab Results   Component Value Date    CHOL 134 06/24/2024    HDL 66.0 06/24/2024    CHHDL 2.0 06/24/2024    VLDL 11 06/24/2024    TRIG 54 06/24/2024    NHDL 68 06/24/2024       RENAL FUNCTION PANEL -   Lab Results   Component Value Date    K 4.1 06/24/2024       No results found for: \"BNP\", \"HGBA1C\"    ECG:    Echo:  No echocardiogram results found for the past 12 months       Assessment and Recommendations     Assessment/Plan       1. Coronary artery calcification seen on CAT scan    - Referral to Cardiology    2. Mixed hyperlipidemia  The patient's lipids are well controlled on chronic statin therapy and they are meeting their goal LDL cholesterol per the ACC/AHA guidelines.      - Referral to Cardiology    3. Coronary artery disease involving native coronary artery of native heart without angina pectoris  The patient's CAD, as detailed in the HPI, has been clinically stable, without any anginal symptoms or dyspnea.  The patient will continue treatment with guideline-directed medical therapy with statin medications and was advised regular exercise and a heart healthy diet.    Start ASA 81 every day.  Stress echo.    4. Essential (primary) hypertension  The patient's blood pressure has been well-controlled at today's appointment or by recent primary care provider's measurements/home measurements and meets their goal blood pressure per the ACC/AHA guidelines.  The patient has been compliant with their anti-hypertensive medications and is following a low sodium/DASH diet. I advised continuation of their present medical treatment and lifestyle modification.             Abdoul Duncan MD    Exclusive of any other services or procedures performed, I, Abdoul Duncan MD , spent 30 minutes in duration for this visit today.  This time consisted of chart review, obtaining " history, and/or performing the exam as documented above as well as documenting the clinical information for the encounter in the electronic record, discussing treatment options, plans, and/or goals with patient, family, and/or caregiver, refilling medications, updating the electronic record, ordering medicines, lab work, imaging, referrals, and/or procedures as documented above and communicating with other [facility] professionals. I have discussed the results of laboratory, radiology, and cardiology studies with the patient and their family/caregiver.

## 2024-09-23 ENCOUNTER — APPOINTMENT (OUTPATIENT)
Dept: PRIMARY CARE | Facility: CLINIC | Age: 69
End: 2024-09-23
Payer: COMMERCIAL

## 2024-09-23 VITALS
SYSTOLIC BLOOD PRESSURE: 130 MMHG | HEART RATE: 66 BPM | DIASTOLIC BLOOD PRESSURE: 80 MMHG | BODY MASS INDEX: 29.47 KG/M2 | WEIGHT: 199 LBS | HEIGHT: 69 IN

## 2024-09-23 DIAGNOSIS — R35.1 BPH ASSOCIATED WITH NOCTURIA: ICD-10-CM

## 2024-09-23 DIAGNOSIS — Z23 NEED FOR SHINGLES VACCINE: ICD-10-CM

## 2024-09-23 DIAGNOSIS — I10 ESSENTIAL (PRIMARY) HYPERTENSION: ICD-10-CM

## 2024-09-23 DIAGNOSIS — K21.9 GASTROESOPHAGEAL REFLUX DISEASE WITHOUT ESOPHAGITIS: ICD-10-CM

## 2024-09-23 DIAGNOSIS — N40.1 BPH ASSOCIATED WITH NOCTURIA: ICD-10-CM

## 2024-09-23 DIAGNOSIS — E78.2 MIXED HYPERLIPIDEMIA: Primary | ICD-10-CM

## 2024-09-23 PROBLEM — N52.9 MALE ERECTILE DISORDER: Status: RESOLVED | Noted: 2023-03-06 | Resolved: 2024-09-23

## 2024-09-23 PROCEDURE — 90750 HZV VACC RECOMBINANT IM: CPT | Performed by: FAMILY MEDICINE

## 2024-09-23 PROCEDURE — 3008F BODY MASS INDEX DOCD: CPT | Performed by: FAMILY MEDICINE

## 2024-09-23 PROCEDURE — 99214 OFFICE O/P EST MOD 30 MIN: CPT | Performed by: FAMILY MEDICINE

## 2024-09-23 PROCEDURE — 1036F TOBACCO NON-USER: CPT | Performed by: FAMILY MEDICINE

## 2024-09-23 PROCEDURE — 3079F DIAST BP 80-89 MM HG: CPT | Performed by: FAMILY MEDICINE

## 2024-09-23 PROCEDURE — 1159F MED LIST DOCD IN RCRD: CPT | Performed by: FAMILY MEDICINE

## 2024-09-23 PROCEDURE — 3075F SYST BP GE 130 - 139MM HG: CPT | Performed by: FAMILY MEDICINE

## 2024-09-23 PROCEDURE — 90471 IMMUNIZATION ADMIN: CPT | Performed by: FAMILY MEDICINE

## 2024-09-23 RX ORDER — PANTOPRAZOLE SODIUM 40 MG/1
40 TABLET, DELAYED RELEASE ORAL
Qty: 90 TABLET | Refills: 1 | Status: SHIPPED | OUTPATIENT
Start: 2024-09-23 | End: 2025-03-22

## 2024-09-23 RX ORDER — LISINOPRIL 5 MG/1
5 TABLET ORAL DAILY
Qty: 90 TABLET | Refills: 1 | Status: SHIPPED | OUTPATIENT
Start: 2024-09-23 | End: 2025-03-22

## 2024-09-23 RX ORDER — TADALAFIL 5 MG/1
5 TABLET ORAL DAILY
Qty: 90 TABLET | Refills: 1 | Status: SHIPPED | OUTPATIENT
Start: 2024-09-23 | End: 2025-03-22

## 2024-09-23 RX ORDER — ATORVASTATIN CALCIUM 80 MG/1
80 TABLET, FILM COATED ORAL DAILY
Qty: 90 TABLET | Refills: 1 | Status: SHIPPED | OUTPATIENT
Start: 2024-09-23 | End: 2025-03-22

## 2024-09-23 ASSESSMENT — PATIENT HEALTH QUESTIONNAIRE - PHQ9
1. LITTLE INTEREST OR PLEASURE IN DOING THINGS: NOT AT ALL
SUM OF ALL RESPONSES TO PHQ9 QUESTIONS 1 AND 2: 0
2. FEELING DOWN, DEPRESSED OR HOPELESS: NOT AT ALL

## 2024-09-23 NOTE — PROGRESS NOTES
Subjective   Patient ID: Bal Rosado is a 68 y.o. male who presents for Hyperlipidemia, Hypertension, GERD, and Erectile Dysfunction.    Past Medical, Surgical, and Family History reviewed and updated in chart.    Reviewed all medications by prescribing practitioner or clinical pharmacist (such as prescriptions, OTCs, herbal therapies and supplements) and documented in the medical record.    HPI  1. Hyperlipidemia  LDL 57, Cholesterol 134, Triglycerides 54 as of 06/24/2024. ASCVD Risk 9.7%. CT Cardiac Score: 412. Currently taking Lipitor 80 mg daily. Upcoming stress test with Dr. Duncan on 10/8/2024. Tolerating the medication well without adverse side effects. Denies ACS symptoms or myalgias. Requesting a refill at this time.    2. Hypertension  Bal is currently taking Lisinopril 5 mg daily. Tolerating the medication well without adverse side effects. Denies weakness, confusion, headache, vision or hearing changes, angioedema, cough, shortness of breath, chest pain, palpitations, abdominal pain, postprandial pain, calf pain, claudication, or lower extremity edema. Requesting a refill at this time.    3. BPH with nocturia  Currently taking Cialis 5 mg. Tolerating the medication well without adverse side effects. Reports 4-5 episodes of nighttime awakening to urinate. Has an appointment scheduled with Urologist Dr. Mays on October 28. Bothered by the frequent nighttime awakenings, not feeling well-rested upon waking, waking his dogs throughout the night, and is becoming increasingly frustrated with the situation.    4. Esophageal Reflux  Currently taking Protonix 40 mg daily. Tolerating the medication well without adverse side effects. Denies any heartburn or reflux symptoms. Requesting a refill at this time.    5. Vaccine update  Had Shingrix in June and is willing to have the second shot today. Not interested in pursuing the influenza vaccine at this time.    Review of Systems  All pertinent positive symptoms are  included in the history of present illness.  All other systems have been reviewed and are negative and noncontributory to this patient's current ailments.    Past Medical History:   Diagnosis Date    Coronary artery disease involving native coronary artery of native heart without angina pectoris 08/28/2024     on 8/21/24      Cough, unspecified 11/23/2015    Cough    Essential (primary) hypertension 08/31/2022    Benign essential hypertension    Gastro-esophageal reflux disease without esophagitis 06/29/2022    Esophageal reflux    Medial meniscus tear 12/22/2023    Personal history of diseases of the skin and subcutaneous tissue 03/09/2016    History of rosacea    Personal history of other drug therapy 09/24/2019    History of influenza vaccination    Personal history of pneumonia (recurrent) 11/23/2015    History of bacterial pneumonia     Past Surgical History:   Procedure Laterality Date    HERNIA REPAIR  01/20/2014    Hernia Repair    KNEE SURGERY  01/20/2014    Knee Surgery     Social History     Tobacco Use    Smoking status: Never    Smokeless tobacco: Never   Substance Use Topics    Alcohol use: Yes     Comment: Socially    Drug use: Never     Family History   Problem Relation Name Age of Onset    Gaucher's disease Daughter      Gaucher's disease Son      Lung cancer Other      Leukemia Other       Immunization History   Administered Date(s) Administered    Flu vaccine (IIV4), preservative free *Check age/dose* 03/09/2016, 01/19/2017, 09/24/2019    Influenza, seasonal, injectable 12/10/2012, 10/08/2020    Pfizer Purple Cap SARS-CoV-2 03/31/2021, 04/17/2021, 12/27/2021    Pneumococcal conjugate vaccine, 13-valent (PREVNAR 13) 03/14/2017    Pneumococcal conjugate vaccine, 20-valent (PREVNAR 20) 06/24/2024    Pneumococcal polysaccharide vaccine, 23-valent, age 2 years and older (PNEUMOVAX 23) 03/09/2016, 08/10/2021    SARS-CoV-2, Unspecified 08/02/2022    Td vaccine, age 7 years and older (TDVAX)  "07/01/2018    Tdap vaccine, age 7 year and older (BOOSTRIX, ADACEL) 07/14/2011, 07/01/2017    Zoster vaccine, recombinant, adult (SHINGRIX) 06/24/2024, 09/23/2024     Current Outpatient Medications   Medication Instructions    aspirin 81 mg, oral, Daily    atorvastatin (LIPITOR) 80 mg, oral, Daily    lisinopril 5 mg, oral, Daily    pantoprazole (PROTONIX) 40 mg, oral, Daily before breakfast, Take 30 minutes before breakfast    sildenafil (VIAGRA) 100 mg, oral, As needed    tadalafil (CIALIS) 5 mg, oral, Daily     No Known Allergies    Objective   Vitals:    09/23/24 1350   BP: 130/80   Pulse: 66   Weight: 90.3 kg (199 lb)   Height: 1.75 m (5' 8.9\")     Body mass index is 29.47 kg/m².    BP Readings from Last 3 Encounters:   09/23/24 130/80   08/28/24 109/61   06/24/24 124/70      Wt Readings from Last 3 Encounters:   09/23/24 90.3 kg (199 lb)   08/28/24 89.4 kg (197 lb)   06/24/24 90.3 kg (199 lb)      No visits with results within 1 Month(s) from this visit.   Latest known visit with results is:   Lab on 06/24/2024   Component Date Value    Glucose 06/24/2024 92     Sodium 06/24/2024 137     Potassium 06/24/2024 4.1     Chloride 06/24/2024 105     Bicarbonate 06/24/2024 25     Anion Gap 06/24/2024 11     Urea Nitrogen 06/24/2024 14     Creatinine 06/24/2024 0.74     eGFR 06/24/2024 >90     Calcium 06/24/2024 9.1     Albumin 06/24/2024 4.3     Alkaline Phosphatase 06/24/2024 44     Total Protein 06/24/2024 6.5     AST 06/24/2024 28     Bilirubin, Total 06/24/2024 1.1     ALT 06/24/2024 17     Cholesterol 06/24/2024 134     HDL-Cholesterol 06/24/2024 66.0     Cholesterol/HDL Ratio 06/24/2024 2.0     LDL Calculated 06/24/2024 57     VLDL 06/24/2024 11     Triglycerides 06/24/2024 54     Non HDL Cholesterol 06/24/2024 68     Vitamin D, 25-Hydroxy, T* 06/24/2024 28 (L)      Physical Exam  CONSTITUTIONAL - well nourished, well developed, looks like stated age, in no acute distress, not ill-appearing, and not tired " appearing  SKIN - normal skin color and pigmentation, normal skin turgor without rash, lesions, or nodules visualized  HEAD - no trauma, normocephalic  EYES - EOMI, anicteric sclera  NECK - supple without rigidity, no neck mass was observed, no thyromegaly or thyroid nodules  CHEST - clear to auscultation, no wheezing, no crackles and no rales, good effort  CARDIAC - regular rate and regular rhythm, no skipped beats, no murmur  ABDOMEN - no organomegaly, soft, nontender, nondistended, normal bowel sounds, no guarding/rebound/rigidity  EXTREMITIES - no obvious or evident edema, no obvious or evident deformities  NEUROLOGICAL - alert, oriented and no focal signs  PSYCHIATRIC - alert, pleasant and cordial, age-appropriate    Assessment/Plan   Problem List Items Addressed This Visit       Esophageal reflux     Stable, no changes to medication recommended         Relevant Medications    pantoprazole (ProtoNix) 40 mg EC tablet    Hyperlipidemia - Primary     Blood work has looked excellent for the past several years, most recently in June  Stable, no changes to medication recommended          Relevant Medications    atorvastatin (Lipitor) 80 mg tablet    Essential (primary) hypertension     Stable, no changes to medication recommended  I would like to have you monitor and record blood pressures at home   Blood pressure goal should be below 130/80, ideally 120/80  If the blood pressure is too high or too low, we need to consider making adjustments to your antihypertensive therapy         Relevant Medications    lisinopril 5 mg tablet    BPH associated with nocturia     Please keep appointment with urology, and please discuss whether or not you would be a candidate for prostate procedure such as HoLEP, as I believe this procedure may benefit you significantly in regard to your nocturia         Relevant Medications    tadalafil (Cialis) 5 mg tablet     Other Visit Diagnoses       Need for shingles vaccine        Relevant  Orders    Zoster vaccine, recombinant, adult (SHINGRIX) (Completed)

## 2024-09-23 NOTE — ASSESSMENT & PLAN NOTE
Blood work has looked excellent for the past several years, most recently in June Stable, no changes to medication recommended

## 2024-09-23 NOTE — ASSESSMENT & PLAN NOTE
Please keep appointment with urology, and please discuss whether or not you would be a candidate for prostate procedure such as HoLEP, as I believe this procedure may benefit you significantly in regard to your nocturia

## 2024-10-02 ENCOUNTER — TELEPHONE (OUTPATIENT)
Dept: CARDIOLOGY | Facility: CLINIC | Age: 69
End: 2024-10-02
Payer: COMMERCIAL

## 2024-10-08 ENCOUNTER — HOSPITAL ENCOUNTER (OUTPATIENT)
Dept: CARDIOLOGY | Facility: CLINIC | Age: 69
Discharge: HOME | End: 2024-10-08
Payer: COMMERCIAL

## 2024-10-08 VITALS
HEIGHT: 68 IN | DIASTOLIC BLOOD PRESSURE: 79 MMHG | BODY MASS INDEX: 30.16 KG/M2 | HEART RATE: 67 BPM | WEIGHT: 199 LBS | SYSTOLIC BLOOD PRESSURE: 148 MMHG

## 2024-10-08 DIAGNOSIS — I25.10 CORONARY ARTERY CALCIFICATION SEEN ON CAT SCAN: ICD-10-CM

## 2024-10-08 DIAGNOSIS — I10 ESSENTIAL (PRIMARY) HYPERTENSION: ICD-10-CM

## 2024-10-08 DIAGNOSIS — E78.2 MIXED HYPERLIPIDEMIA: ICD-10-CM

## 2024-10-08 DIAGNOSIS — I25.10 CORONARY ARTERY DISEASE INVOLVING NATIVE CORONARY ARTERY OF NATIVE HEART WITHOUT ANGINA PECTORIS: ICD-10-CM

## 2024-10-08 PROCEDURE — 93017 CV STRESS TEST TRACING ONLY: CPT

## 2024-10-08 PROCEDURE — 93016 CV STRESS TEST SUPVJ ONLY: CPT | Performed by: INTERNAL MEDICINE

## 2024-10-08 PROCEDURE — 93018 CV STRESS TEST I&R ONLY: CPT | Performed by: INTERNAL MEDICINE

## 2024-10-08 PROCEDURE — 93350 STRESS TTE ONLY: CPT | Performed by: INTERNAL MEDICINE

## 2024-10-22 ENCOUNTER — LAB (OUTPATIENT)
Dept: LAB | Facility: LAB | Age: 69
End: 2024-10-22
Payer: COMMERCIAL

## 2024-10-22 DIAGNOSIS — R97.20 ELEVATED PSA: ICD-10-CM

## 2024-10-22 PROCEDURE — 84153 ASSAY OF PSA TOTAL: CPT

## 2024-10-22 PROCEDURE — 36415 COLL VENOUS BLD VENIPUNCTURE: CPT

## 2024-10-22 PROCEDURE — 84154 ASSAY OF PSA FREE: CPT

## 2024-10-24 LAB
PSA FREE MFR SERPL: 15 %
PSA FREE SERPL-MCNC: 0.7 NG/ML
PSA SERPL IA-MCNC: 4.6 NG/ML (ref 0–4)

## 2024-10-28 ENCOUNTER — OFFICE VISIT (OUTPATIENT)
Dept: UROLOGY | Facility: HOSPITAL | Age: 69
End: 2024-10-28
Payer: COMMERCIAL

## 2024-10-28 DIAGNOSIS — R35.1 BPH ASSOCIATED WITH NOCTURIA: Primary | ICD-10-CM

## 2024-10-28 DIAGNOSIS — R97.20 ELEVATED PSA: ICD-10-CM

## 2024-10-28 DIAGNOSIS — N40.1 BPH ASSOCIATED WITH NOCTURIA: Primary | ICD-10-CM

## 2024-10-28 LAB
POC APPEARANCE, URINE: CLEAR
POC BILIRUBIN, URINE: NEGATIVE
POC BLOOD, URINE: NEGATIVE
POC COLOR, URINE: YELLOW
POC GLUCOSE, URINE: NEGATIVE MG/DL
POC KETONES, URINE: NEGATIVE MG/DL
POC LEUKOCYTES, URINE: NEGATIVE
POC NITRITE,URINE: NEGATIVE
POC PH, URINE: 6 PH
POC PROTEIN, URINE: NEGATIVE MG/DL
POC SPECIFIC GRAVITY, URINE: 1.02
POC UROBILINOGEN, URINE: 1 EU/DL

## 2024-10-28 PROCEDURE — 51798 US URINE CAPACITY MEASURE: CPT | Performed by: UROLOGY

## 2024-10-28 PROCEDURE — 99213 OFFICE O/P EST LOW 20 MIN: CPT | Performed by: UROLOGY

## 2024-10-28 PROCEDURE — G2211 COMPLEX E/M VISIT ADD ON: HCPCS | Performed by: UROLOGY

## 2024-10-28 PROCEDURE — 99213 OFFICE O/P EST LOW 20 MIN: CPT | Mod: 25 | Performed by: UROLOGY

## 2024-10-28 PROCEDURE — 81003 URINALYSIS AUTO W/O SCOPE: CPT | Mod: QW | Performed by: UROLOGY

## 2024-12-12 ENCOUNTER — APPOINTMENT (OUTPATIENT)
Dept: UROLOGY | Facility: HOSPITAL | Age: 69
End: 2024-12-12
Payer: COMMERCIAL

## 2024-12-16 ENCOUNTER — APPOINTMENT (OUTPATIENT)
Dept: PRIMARY CARE | Facility: CLINIC | Age: 69
End: 2024-12-16
Payer: COMMERCIAL

## 2024-12-16 ENCOUNTER — LAB (OUTPATIENT)
Dept: LAB | Facility: LAB | Age: 69
End: 2024-12-16
Payer: COMMERCIAL

## 2024-12-16 VITALS
WEIGHT: 196 LBS | BODY MASS INDEX: 29.7 KG/M2 | HEIGHT: 68 IN | SYSTOLIC BLOOD PRESSURE: 120 MMHG | DIASTOLIC BLOOD PRESSURE: 72 MMHG | HEART RATE: 67 BPM

## 2024-12-16 DIAGNOSIS — M25.511 CHRONIC PAIN OF BOTH SHOULDERS: ICD-10-CM

## 2024-12-16 DIAGNOSIS — G89.29 CHRONIC PAIN OF BOTH SHOULDERS: ICD-10-CM

## 2024-12-16 DIAGNOSIS — I10 ESSENTIAL (PRIMARY) HYPERTENSION: ICD-10-CM

## 2024-12-16 DIAGNOSIS — K21.9 GASTROESOPHAGEAL REFLUX DISEASE WITHOUT ESOPHAGITIS: ICD-10-CM

## 2024-12-16 DIAGNOSIS — E55.9 VITAMIN D DEFICIENCY: ICD-10-CM

## 2024-12-16 DIAGNOSIS — M54.50 CHRONIC BILATERAL LOW BACK PAIN WITHOUT SCIATICA: ICD-10-CM

## 2024-12-16 DIAGNOSIS — M25.512 CHRONIC PAIN OF BOTH SHOULDERS: ICD-10-CM

## 2024-12-16 DIAGNOSIS — M62.838 TRAPEZIUS MUSCLE SPASM: ICD-10-CM

## 2024-12-16 DIAGNOSIS — E78.2 MIXED HYPERLIPIDEMIA: ICD-10-CM

## 2024-12-16 DIAGNOSIS — R97.20 ELEVATED PSA: ICD-10-CM

## 2024-12-16 DIAGNOSIS — Z23 FLU VACCINE NEED: ICD-10-CM

## 2024-12-16 DIAGNOSIS — R35.1 BPH ASSOCIATED WITH NOCTURIA: ICD-10-CM

## 2024-12-16 DIAGNOSIS — N40.1 BPH ASSOCIATED WITH NOCTURIA: ICD-10-CM

## 2024-12-16 DIAGNOSIS — G89.29 CHRONIC BILATERAL LOW BACK PAIN WITHOUT SCIATICA: ICD-10-CM

## 2024-12-16 DIAGNOSIS — E78.2 MIXED HYPERLIPIDEMIA: Primary | ICD-10-CM

## 2024-12-16 DIAGNOSIS — N52.9 MALE ERECTILE DISORDER: ICD-10-CM

## 2024-12-16 LAB
25(OH)D3 SERPL-MCNC: 22 NG/ML (ref 30–100)
ALBUMIN SERPL BCP-MCNC: 4.5 G/DL (ref 3.4–5)
ALP SERPL-CCNC: 51 U/L (ref 33–136)
ALT SERPL W P-5'-P-CCNC: 16 U/L (ref 10–52)
ANION GAP SERPL CALC-SCNC: 11 MMOL/L (ref 10–20)
AST SERPL W P-5'-P-CCNC: 22 U/L (ref 9–39)
BILIRUB SERPL-MCNC: 0.8 MG/DL (ref 0–1.2)
BUN SERPL-MCNC: 14 MG/DL (ref 6–23)
CALCIUM SERPL-MCNC: 9.4 MG/DL (ref 8.6–10.3)
CHLORIDE SERPL-SCNC: 104 MMOL/L (ref 98–107)
CHOLEST SERPL-MCNC: 128 MG/DL (ref 0–199)
CHOLESTEROL/HDL RATIO: 2.3
CO2 SERPL-SCNC: 26 MMOL/L (ref 21–32)
CREAT SERPL-MCNC: 0.87 MG/DL (ref 0.5–1.3)
EGFRCR SERPLBLD CKD-EPI 2021: >90 ML/MIN/1.73M*2
GLUCOSE SERPL-MCNC: 87 MG/DL (ref 74–99)
HDLC SERPL-MCNC: 55.7 MG/DL
LDLC SERPL CALC-MCNC: 59 MG/DL
NON HDL CHOLESTEROL: 72 MG/DL (ref 0–149)
POTASSIUM SERPL-SCNC: 3.7 MMOL/L (ref 3.5–5.3)
PROT SERPL-MCNC: 7.1 G/DL (ref 6.4–8.2)
PSA SERPL-MCNC: 5.57 NG/ML
SODIUM SERPL-SCNC: 137 MMOL/L (ref 136–145)
TRIGL SERPL-MCNC: 69 MG/DL (ref 0–149)
VLDL: 14 MG/DL (ref 0–40)

## 2024-12-16 PROCEDURE — 84153 ASSAY OF PSA TOTAL: CPT

## 2024-12-16 PROCEDURE — 1159F MED LIST DOCD IN RCRD: CPT | Performed by: FAMILY MEDICINE

## 2024-12-16 PROCEDURE — 90662 IIV NO PRSV INCREASED AG IM: CPT | Performed by: FAMILY MEDICINE

## 2024-12-16 PROCEDURE — 82306 VITAMIN D 25 HYDROXY: CPT

## 2024-12-16 PROCEDURE — 80053 COMPREHEN METABOLIC PANEL: CPT

## 2024-12-16 PROCEDURE — 3074F SYST BP LT 130 MM HG: CPT | Performed by: FAMILY MEDICINE

## 2024-12-16 PROCEDURE — 36415 COLL VENOUS BLD VENIPUNCTURE: CPT

## 2024-12-16 PROCEDURE — 80061 LIPID PANEL: CPT

## 2024-12-16 PROCEDURE — 90471 IMMUNIZATION ADMIN: CPT | Performed by: FAMILY MEDICINE

## 2024-12-16 PROCEDURE — 99214 OFFICE O/P EST MOD 30 MIN: CPT | Performed by: FAMILY MEDICINE

## 2024-12-16 PROCEDURE — 3078F DIAST BP <80 MM HG: CPT | Performed by: FAMILY MEDICINE

## 2024-12-16 PROCEDURE — 1036F TOBACCO NON-USER: CPT | Performed by: FAMILY MEDICINE

## 2024-12-16 PROCEDURE — 3008F BODY MASS INDEX DOCD: CPT | Performed by: FAMILY MEDICINE

## 2024-12-16 RX ORDER — SILDENAFIL 100 MG/1
100 TABLET, FILM COATED ORAL AS NEEDED
Qty: 30 TABLET | Refills: 2 | Status: SHIPPED | OUTPATIENT
Start: 2024-12-16

## 2024-12-16 RX ORDER — LISINOPRIL 5 MG/1
5 TABLET ORAL DAILY
Qty: 90 TABLET | Refills: 1 | Status: SHIPPED | OUTPATIENT
Start: 2024-12-16 | End: 2025-06-14

## 2024-12-16 RX ORDER — TADALAFIL 5 MG/1
5 TABLET ORAL DAILY
Qty: 90 TABLET | Refills: 1 | Status: SHIPPED | OUTPATIENT
Start: 2024-12-16 | End: 2025-06-14

## 2024-12-16 RX ORDER — PANTOPRAZOLE SODIUM 40 MG/1
40 TABLET, DELAYED RELEASE ORAL
Qty: 90 TABLET | Refills: 1 | Status: SHIPPED | OUTPATIENT
Start: 2024-12-16 | End: 2025-06-14

## 2024-12-16 RX ORDER — ATORVASTATIN CALCIUM 80 MG/1
80 TABLET, FILM COATED ORAL DAILY
Qty: 90 TABLET | Refills: 1 | Status: SHIPPED | OUTPATIENT
Start: 2024-12-16 | End: 2025-06-14

## 2024-12-16 NOTE — ASSESSMENT & PLAN NOTE
Blood work has looked excellent for the past several years, most recently in June, fasting blood work ordered    Stable, no changes to medication recommended

## 2024-12-16 NOTE — PROGRESS NOTES
Subjective   Patient ID: Bal Rosado is a 69 y.o. male who presents for Hypertension, Hyperlipidemia, and GERD.    Past Medical, Surgical, and Family History reviewed and updated in chart.    Reviewed all medications by prescribing practitioner or clinical pharmacist (such as prescriptions, OTCs, herbal therapies and supplements) and documented in the medical record.    HPI  1. Hyperlipidemia    Bal's recent laboratory results as of June 24, 2024, show an LDL of 57, total cholesterol of 134, and triglycerides of 54. His ASCVD risk is calculated at 17.7%. A CT cardiac score was 412. He is currently taking Lipitor 80 mg daily and reports no adverse side effects. A stress test conducted by Dr. Duncan on October 8, 2024, was normal. Bal denies experiencing any ACS symptoms or myalgias and is requesting a medication refill.    2. Hypertension    Bal is on Lisinopril 5 mg daily and is tolerating the medication well, with no reported side effects. He denies experiencing any weakness, confusion, headaches, changes in vision or hearing, angioedema, cough, shortness of breath, chest pain, palpitations, abdominal pain, postprandial pain, calf pain, claudication, or lower extremity edema. He is requesting a refill of his prescription.    3. BPH with Nocturia/Elevated PSA    Bal has been treated by Dr. Mays for elevated PSA in the past. An MRI of the prostate in 2019 revealed a PI-RADS 4 lesion, but a biopsy in 2021 was negative for cancer. Despite being reassured about his condition, Bal is concerned due to his father's history of cancer and wishes to pursue a more aggressive workup. He is interested in obtaining a second opinion from a urologist. He is troubled by frequent nighttime awakenings, which affect his rest and disturb his dogs, leading to increased frustration.    4. Esophageal Reflux    Bal is currently taking Protonix 40 mg daily and is tolerating it well without any adverse effects. He denies experiencing  heartburn or reflux symptoms and is requesting a refill of his medication.    5. Bilateral Shoulder Pain/Trapezius    Bal reports bilateral shoulder pain related to his occupation and believes he has bilateral torn rotator cuffs. He states that cortisone injections have not provided relief and he declines the use of over-the-counter analgesics. Occasionally, he experiences issues with the left side of his neck extending into the trapezius area, and he is concerned about a possible connection to prostate issues, despite no diagnosis of cancer.    6. Erectile Dysfunction    Bal is currently using Cialis 5 mg daily and Viagra 100 mg as needed. His condition is stable, and he is requesting refills of his medications.    Review of Systems  All pertinent positive symptoms are included in the history of present illness.    All other systems have been reviewed and are negative and noncontributory to this patient's current ailments.    Past Medical History:   Diagnosis Date    Coronary artery disease involving native coronary artery of native heart without angina pectoris 08/28/2024     on 8/21/24      Cough, unspecified 11/23/2015    Cough    Essential (primary) hypertension 08/31/2022    Benign essential hypertension    Gastro-esophageal reflux disease without esophagitis 06/29/2022    Esophageal reflux    Medial meniscus tear 12/22/2023    Personal history of diseases of the skin and subcutaneous tissue 03/09/2016    History of rosacea    Personal history of other drug therapy 09/24/2019    History of influenza vaccination    Personal history of pneumonia (recurrent) 11/23/2015    History of bacterial pneumonia     Past Surgical History:   Procedure Laterality Date    HERNIA REPAIR  01/20/2014    Hernia Repair    KNEE SURGERY  01/20/2014    Knee Surgery     Social History     Tobacco Use    Smoking status: Never    Smokeless tobacco: Never   Substance Use Topics    Alcohol use: Yes     Comment: Socially    Drug  "use: Never     Family History   Problem Relation Name Age of Onset    Gaucher's disease Daughter      Gaucher's disease Son      Lung cancer Other      Leukemia Other       Immunization History   Administered Date(s) Administered    Flu vaccine (IIV4), preservative free *Check age/dose* 03/09/2016, 01/19/2017, 09/24/2019    Flu vaccine, trivalent, preservative free, HIGH-DOSE, age 65y+ (Fluzone) 12/16/2024    Influenza, seasonal, injectable 12/10/2012, 10/08/2020    Pfizer Purple Cap SARS-CoV-2 03/31/2021, 04/17/2021, 12/27/2021    Pneumococcal conjugate vaccine, 13-valent (PREVNAR 13) 03/14/2017    Pneumococcal conjugate vaccine, 20-valent (PREVNAR 20) 06/24/2024    Pneumococcal polysaccharide vaccine, 23-valent, age 2 years and older (PNEUMOVAX 23) 03/09/2016, 08/10/2021    SARS-CoV-2, Unspecified 08/02/2022    Td vaccine, age 7 years and older (TDVAX) 07/01/2018    Tdap vaccine, age 7 year and older (BOOSTRIX, ADACEL) 07/14/2011, 07/01/2017    Zoster vaccine, recombinant, adult (SHINGRIX) 06/24/2024, 09/23/2024     Current Outpatient Medications   Medication Instructions    atorvastatin (LIPITOR) 80 mg, oral, Daily    lisinopril 5 mg, oral, Daily    pantoprazole (PROTONIX) 40 mg, oral, Daily before breakfast, Take 30 minutes before breakfast    sildenafil (VIAGRA) 100 mg, oral, As needed    tadalafil (CIALIS) 5 mg, oral, Daily     No Known Allergies    Objective   Vitals:    12/16/24 1340   BP: 120/72   Pulse: 67   Weight: 88.9 kg (196 lb)   Height: 1.727 m (5' 8\")     Body mass index is 29.8 kg/m².    BP Readings from Last 3 Encounters:   12/16/24 120/72   10/08/24 148/79   09/23/24 130/80      Wt Readings from Last 3 Encounters:   12/16/24 88.9 kg (196 lb)   10/08/24 90.3 kg (199 lb)   09/23/24 90.3 kg (199 lb)      Physical Exam  CONSTITUTIONAL - well nourished, well developed, looks like stated age, in no acute distress, not ill-appearing, and not tired appearing  SKIN - normal skin color and pigmentation, " normal skin turgor without rash, lesions, or nodules visualized  HEAD - no trauma, normocephalic  NECK - supple without rigidity, no neck mass was observed, no thyromegaly or thyroid nodules  CHEST - clear to auscultation, no wheezing, no crackles and no rales, good effort  CARDIAC - regular rate and regular rhythm, no skipped beats, no murmur  EXTREMITIES - no obvious or evident edema, no obvious or evident deformities; left trapezius with tender musculature, muscle knots, with rotation of the head to either side limited secondary to discomfort  NEUROLOGICAL - normal gait, normal balance, normal motor, no ataxia, DTRs equal and symmetrical; alert, oriented and no focal signs  PSYCHIATRIC - alert, pleasant and cordial, age-appropriate    Assessment/Plan   Problem List Items Addressed This Visit       Bilateral shoulder pain     You reported bilateral torn rotator cuffs due to you occupation which involves a lot of heavy lifting.     If interest in a further evaluation, please let me know and we can send you to an orthopedic surgeon.         Elevated PSA     I understand your concerns about the PI-RADS 4 lesion, elevated PSA, and therefore we will get you a second opinion with Dr. Vallecillo in [place], please schedule that visit at your earliest mutual convenience         Relevant Orders    PSA    Referral to Urology    Esophageal reflux     Stable, no changes to medication recommended         Relevant Medications    pantoprazole (ProtoNix) 40 mg EC tablet    Hyperlipidemia - Primary     Blood work has looked excellent for the past several years, most recently in June, fasting blood work ordered    Stable, no changes to medication recommended          Relevant Medications    atorvastatin (Lipitor) 80 mg tablet    Other Relevant Orders    Lipid panel    Comprehensive metabolic panel    Male erectile disorder    Relevant Medications    sildenafil (Viagra) 100 mg tablet    tadalafil (Cialis) 5 mg tablet    Vitamin D  deficiency     At the time this office note was being completed, the vitamin D was noted to be a bit low at 28 so I would recommend either considering 50,000 IU weekly, or daily vitamin D at 2000 IU         Relevant Orders    Vitamin D 25-Hydroxy,Total (for eval of Vitamin D levels)    Essential (primary) hypertension     Stable, no changes to medication recommended  I would like to have you monitor and record blood pressures at home   Blood pressure goal should be below 130/80, ideally 120/80  If the blood pressure is too high or too low, we need to consider making adjustments to your antihypertensive therapy         Relevant Medications    lisinopril 5 mg tablet    Other Relevant Orders    Lipid panel    Comprehensive metabolic panel    BPH associated with nocturia     Continue Cialis daily         Relevant Medications    tadalafil (Cialis) 5 mg tablet    Trapezius muscle spasm     Suggested doing some stretching techniques at home that will allow to loosen the trapezius muscle up and improve the overall pain         Chronic bilateral low back pain without sciatica     Due to the chronicity of your back pain, I suggested an x-ray to further evaluate for underlying bony pathology         Relevant Orders    XR lumbar spine complete 4+ views     Other Visit Diagnoses       Flu vaccine need        All questions were answered and you were counseled on immunization(s) in detail and vaccination was provided    Relevant Orders    Flu vaccine, trivalent, preservative free, HIGH-DOSE, age 65y+ (Fluzone) (Completed)

## 2024-12-16 NOTE — ASSESSMENT & PLAN NOTE
You reported bilateral torn rotator cuffs due to you occupation which involves a lot of heavy lifting.     If interest in a further evaluation, please let me know and we can send you to an orthopedic surgeon.

## 2024-12-16 NOTE — ASSESSMENT & PLAN NOTE
I understand your concerns about the PI-RADS 4 lesion, elevated PSA, and therefore we will get you a second opinion with Dr. Vallecillo in [place], please schedule that visit at your earliest mutual convenience

## 2024-12-16 NOTE — ASSESSMENT & PLAN NOTE
Suggested doing some stretching techniques at home that will allow to loosen the trapezius muscle up and improve the overall pain

## 2024-12-16 NOTE — ASSESSMENT & PLAN NOTE
Due to the chronicity of your back pain, I suggested an x-ray to further evaluate for underlying bony pathology

## 2024-12-17 ENCOUNTER — HOSPITAL ENCOUNTER (OUTPATIENT)
Dept: RADIOLOGY | Facility: CLINIC | Age: 69
Discharge: HOME | End: 2024-12-17
Payer: COMMERCIAL

## 2024-12-17 DIAGNOSIS — M54.50 CHRONIC BILATERAL LOW BACK PAIN WITHOUT SCIATICA: ICD-10-CM

## 2024-12-17 DIAGNOSIS — G89.29 CHRONIC BILATERAL LOW BACK PAIN WITHOUT SCIATICA: ICD-10-CM

## 2024-12-17 DIAGNOSIS — E55.9 VITAMIN D DEFICIENCY: Primary | ICD-10-CM

## 2024-12-17 PROCEDURE — 72110 X-RAY EXAM L-2 SPINE 4/>VWS: CPT

## 2024-12-17 PROCEDURE — 72110 X-RAY EXAM L-2 SPINE 4/>VWS: CPT | Performed by: RADIOLOGY

## 2024-12-17 RX ORDER — ERGOCALCIFEROL 1.25 MG/1
50000 CAPSULE ORAL WEEKLY
Qty: 12 CAPSULE | Refills: 3 | Status: SHIPPED | OUTPATIENT
Start: 2024-12-17 | End: 2025-12-17

## 2024-12-17 NOTE — RESULT ENCOUNTER NOTE
Vitamin D is low so we should replace that with weekly dosing.  This may give you a bit more energy  PSA is up at 5.57 previous 4.6 about a month ago, and then a year ago it was 4.6.  I placed a referral for you to be seen by the urologist on the chart  Cholesterol looks excellent  Sugar, kidney, liver, electrolytes normal

## 2024-12-19 NOTE — RESULT ENCOUNTER NOTE
X-ray of the lumbar spine reveals diffuse arthritic changes, slightly worse than before, no evidence of any other disease process

## 2024-12-22 NOTE — PROGRESS NOTES
Subjective   Patient ID: Bal Rosado is a 69 y.o. male. He was kindly referred by Anibal Dumont DO.         HPI  69 y.o. male presents with BPH with LUTS, elevated PSA and ED. He has other diagnosis of HTN and HLD. He was seen previously by Harry Mays MD for this complaint as well as severe ED.     The MRI of the prostate completed  shows a 1 cm left apical peripheral zone PIRADS 4 with possible EPE. He underwent a prostate biopsy on 2020 that was negative for malignancy. He underwent prostate fusion biopsy on , all cores were negative for cancer.   His PSA's since then have been elevated and fluctuant.    He has family history of prostate cancer in his father who  in his 60s from a hematological malignancy.     He is experiencing lower back pain and sore neck. He is concerned these symptoms are a result of potential prostate cancer.     He is concerned about ED and feels as if this has not been addressed previously.      He has no effectiveness with the ED symptoms when prescribed Cialis and tamsulosin. He notes some effectiveness with urinary symptoms. He does not want to do a penile implant or intra-cavernosal injections.       Lab Results   Component Value Date    PSA 5.57 (H) 2024    PSA 4.6 (H) 10/22/2024    PSA 4.6 (H) 10/16/2023    PSA 4.5 (H) 2023    PSA 4.9 (H) 2022       US RENAL COMPLETE; 10/27/2023   IMPRESSION:  1. No sonographic abnormality of either kidney.  2. Enlarged prostate gland with sequela of chronic outlet obstruction  including urinary bladder wall thickening and debris in the urinary  bladder.    MRI PROSTATE SCREENING; 2019   PROSTATE VOLUME:  The prostate measures 5.3 cm x 3.9 cm  x 4.8 cm in right-to-left,  anterior-posterior and craniocaudal dimension.  IMPRESSION:  1. A 10 mm left apical peripheral zone PI-RADS 4 category lesion.  There is concern for possible extracapsular extension.  2. Hypertrophied transition zone has a 6 mm T2  hypointense focus with  diffusion restriction in the right mid transition zone corresponding  to PI-RADS 4 category lesion.  3. Both lesions are amenable for MR in gantry biopsy, if considered.  4. No pelvic lymphadenopathy.    Review of Systems  A complete review of systems was performed. All systems are noted to be negative unless indicated in the history of present illness, impression, active problem list, or past histories.     Objective   Physical Exam  General: Well developed, well nourished, alert and cooperative, appears in no acute distress  Eyes: Non-injected conjunctiva, sclera clear, no proptosis  Cardiac: Extremities are warm and well perfused. No edema, cyanosis or pallor.   Lungs: Breathing is easy, non-labored. Speaking in clear and complete sentences. Normal diaphragmatic movement.  Abdomen: soft, non-tender  MSK: Ambulatory with steady gait, unassisted  Neuro: alert and oriented to person, place and time  Psych: Demonstrates good judgement and reason, without hallucinations, abnormal affect or abnormal behaviors.  Skin: no obvious lesions, no rashes.  : phallus circumcised, normal in size, no plaques or lesions. Testicles normal in size and texture, bilateral cysts   MARIELA: prostate enlarged, 1-1.5cm left nodule felt     Assessment/Plan   Diagnoses and all orders for this visit:  Erectile dysfunction, unspecified erectile dysfunction type  Elevated PSA  -     Referral to Urology  -     Urine Culture; Future  -     Urinalysis with Reflex Microscopic; Future  -     MR prostate with carl boundaries if pirads 3 or above; Future      69 y.o. male presents with BPH with LUTS, elevated PSA and ED. He has other diagnosis of HTN and HLD. He is also followed by Harry Mays MD.     There is a chance that this lesion has increase in size. I advised the patient of the possible change in size, in parallel with his rising elevated PSA. I would not complete a biopsy first without anatomical guidance. The biopsy  could miss another area of the lesion. Therefore, I would recommend an MRI prostate and ultrasound. If an MRI prostate shows lesions, I would order a biopsy.     The PSA has increased slightly to 5.5 from 4.6.     I have had a discussion with the patient regarding his ED and options following failure of oral options, such as intra-cavernosal injections or even penile prosthesis. I do not personally prescribe these injections. He would need to be referred or return to Dr. Mays.     I instructed him to resume his Cialis 5 mg. We will consider adding another medication such as high dose on demand PDE5-i at his next follow up     Plan:  MRI Prostate - based on the MRI results, we will perform a TRUS biopsy of the prostate.   Resume Cialis 5 mg daily   We will consider adding high dose on demand medication at his next follow up depending upon his response and plan regarding elevated PSA.       Scribe Attestation   By signing my name below, IJhon, Scribe attestation that this documentation has been prepared under the direction and in the presence of Ramez Vallecillo MD.    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 Presents here for adjuvant radiation therapy\par \par History of present illness; \par He reported  3 weeks of some difficulty with expressive speech, word substitutions and change in personality - more irritable that usual.\par 5/9/2023 MRI showed L FT mass shows a left temporal heterogeneously enhancing mass measuring 5.0 x 4.5 x 3.3 cm with extension into the left frontal lobe with surrounding edema and left to right midline shift. \par \par 5/15/2023  He underwent Left pterional craniotomy for brain tumor resection with gleolan. Frozen path: high-grade glioma. Bilateral abdominal incisions made for later implantation of drug treatment. IMVAX Biologics were implanted into abdomen via incisions during bedside procedure per IMVAX trial on 5/17 under conscious sedation. These biologic agents were removed during bedside procedure on 5/19 under conscious sedation. Patient was monitored in the NSCU, his post op course remained uncomplicated\par \par Followed up with Dr Galvez. He is planned for  Radiation and TMZ\par \par Today, he reports expressive aphasia, fatigue  is improved. He is off Dexamethasone, denies headaches, vision changes, nausea, balance issues, seizure activities. \par

## 2024-12-23 ENCOUNTER — APPOINTMENT (OUTPATIENT)
Dept: UROLOGY | Facility: CLINIC | Age: 69
End: 2024-12-23
Payer: COMMERCIAL

## 2024-12-23 DIAGNOSIS — R97.20 ELEVATED PSA: ICD-10-CM

## 2024-12-23 DIAGNOSIS — N52.9 ERECTILE DYSFUNCTION, UNSPECIFIED ERECTILE DYSFUNCTION TYPE: Primary | ICD-10-CM

## 2024-12-23 PROCEDURE — 99214 OFFICE O/P EST MOD 30 MIN: CPT | Performed by: STUDENT IN AN ORGANIZED HEALTH CARE EDUCATION/TRAINING PROGRAM

## 2024-12-23 PROCEDURE — G2211 COMPLEX E/M VISIT ADD ON: HCPCS | Performed by: STUDENT IN AN ORGANIZED HEALTH CARE EDUCATION/TRAINING PROGRAM

## 2024-12-24 ENCOUNTER — LAB (OUTPATIENT)
Dept: LAB | Facility: LAB | Age: 69
End: 2024-12-24
Payer: COMMERCIAL

## 2024-12-24 DIAGNOSIS — R97.20 ELEVATED PSA: ICD-10-CM

## 2024-12-24 LAB
APPEARANCE UR: CLEAR
BILIRUB UR STRIP.AUTO-MCNC: NEGATIVE MG/DL
COLOR UR: NORMAL
GLUCOSE UR STRIP.AUTO-MCNC: NORMAL MG/DL
KETONES UR STRIP.AUTO-MCNC: NEGATIVE MG/DL
LEUKOCYTE ESTERASE UR QL STRIP.AUTO: NEGATIVE
NITRITE UR QL STRIP.AUTO: NEGATIVE
PH UR STRIP.AUTO: 6.5 [PH]
PROT UR STRIP.AUTO-MCNC: NEGATIVE MG/DL
RBC # UR STRIP.AUTO: NEGATIVE /UL
SP GR UR STRIP.AUTO: 1
UROBILINOGEN UR STRIP.AUTO-MCNC: NORMAL MG/DL

## 2024-12-24 PROCEDURE — 81003 URINALYSIS AUTO W/O SCOPE: CPT

## 2024-12-24 PROCEDURE — 87086 URINE CULTURE/COLONY COUNT: CPT

## 2024-12-25 LAB — BACTERIA UR CULT: NO GROWTH

## 2025-01-06 DIAGNOSIS — R97.20 ELEVATED PROSTATE SPECIFIC ANTIGEN (PSA): Primary | ICD-10-CM

## 2025-01-06 RX ORDER — DIAZEPAM 10 MG/1
10 TABLET ORAL ONCE
Qty: 1 TABLET | Refills: 0 | Status: SHIPPED | OUTPATIENT
Start: 2025-01-06 | End: 2025-01-06

## 2025-01-15 ENCOUNTER — HOSPITAL ENCOUNTER (OUTPATIENT)
Dept: RADIOLOGY | Facility: HOSPITAL | Age: 70
Discharge: HOME | End: 2025-01-15
Payer: COMMERCIAL

## 2025-01-15 VITALS — BODY MASS INDEX: 29.8 KG/M2 | WEIGHT: 195.99 LBS

## 2025-01-15 DIAGNOSIS — R97.20 ELEVATED PSA: ICD-10-CM

## 2025-01-15 PROCEDURE — A9575 INJ GADOTERATE MEGLUMI 0.1ML: HCPCS | Performed by: STUDENT IN AN ORGANIZED HEALTH CARE EDUCATION/TRAINING PROGRAM

## 2025-01-15 PROCEDURE — 72197 MRI PELVIS W/O & W/DYE: CPT

## 2025-01-15 PROCEDURE — 2550000001 HC RX 255 CONTRASTS: Performed by: STUDENT IN AN ORGANIZED HEALTH CARE EDUCATION/TRAINING PROGRAM

## 2025-01-15 PROCEDURE — 76498 UNLISTED MR PROCEDURE: CPT

## 2025-01-15 RX ORDER — GADOTERATE MEGLUMINE 376.9 MG/ML
INJECTION INTRAVENOUS
Status: CANCELLED | OUTPATIENT
Start: 2025-01-15

## 2025-01-15 RX ORDER — GADOTERATE MEGLUMINE 376.9 MG/ML
18 INJECTION INTRAVENOUS
Status: COMPLETED | OUTPATIENT
Start: 2025-01-15 | End: 2025-01-15

## 2025-01-15 RX ADMIN — GADOTERATE MEGLUMINE 18 ML: 376.9 INJECTION INTRAVENOUS at 08:48

## 2025-01-16 ENCOUNTER — TELEPHONE (OUTPATIENT)
Dept: UROLOGY | Facility: CLINIC | Age: 70
End: 2025-01-16
Payer: COMMERCIAL

## 2025-01-16 NOTE — TELEPHONE ENCOUNTER
Pt LVM on nurse line stating he received his MRI results in [facility] and had questions and would like to move up his 2/6/25 appointment.     Pt would like a call back about results and to see if he can get in sooner. Please advise.

## 2025-01-28 NOTE — PROGRESS NOTES
Subjective   Patient ID: Bal Rosado is a 69 y.o. male.      HPI  69 y.o. male presents with BPH with LUTS, elevated PSA and ED. He is prescribed Cialis 5 mg daily. He presents for prostate MRI results.     The MRI of the prostate completed 12/19 shows a 1 cm left apical peripheral zone PIRADS 4 with possible EPE. He underwent a prostate biopsy on 01/09/2020 that was negative for malignancy. He underwent prostate fusion biopsy on 12/21, all cores were negative for cancer. His PSA's since then have been elevated and fluctuant.    The patient experiences pain in the lower back and hip. He expresses that he has been diagnosed with arthritis in his back due to his career. The patient states that while laying on his right side, he feels that he is laying on top of something. The patient has concerns that the pain in his back and hip are related to cancer.     The patient is experiencing extreme anxiety attacks due to a recent divorce. He states that treatment of prostate cancer in the future could assist him in having a successful relationship in the future.     He has been experiencing ED for several years. He states he has previously tried Cialis and Viagra. He was previously seen by Dr. Mays. He states that Dr. Mays recommended a penile implant or intracavernosal injections for ED.       MR PROSTATE WITH KENA BOUNDARIES IF PIRADS 3 OR ABOVE; 1/15/2025   PROSTATE VOLUME:  The prostate measures  5.5 cm x  4.0 cm  x  5 cm in right-to-left,  anterior-posterior and craniocaudal dimension.  Prostate weight is estimated at 57.6g. PSA density is 0.1 ng/mL/g.  IMPRESSION:  1.  PI-RADS 4 lesion in the right apical peripheral zone      Lab Results   Component Value Date    PSA 5.57 (H) 12/16/2024    PSA 4.6 (H) 10/22/2024    PSA 4.6 (H) 10/16/2023    PSA 4.5 (H) 04/03/2023    PSA 4.9 (H) 09/23/2022         Review of Systems  A complete review of systems was performed. All systems are noted to be negative unless indicated in the  history of present illness, impression, active problem list, or past histories.     Objective   Physical Exam    Assessment/Plan   Diagnoses and all orders for this visit:  Elevated PSA  Erectile dysfunction, unspecified erectile dysfunction type      69 y.o. male presents with BPH with LUTS, elevated PSA and ED. He is prescribed Cialis 5 mg daily. He presents for prostate MRI results.     I personally reviewed the MRI of the prostate on 1/15/2025. The results indicate a lesion of PI-RADS 4 in the right apical peripheral zone. The patient's prostate is enlarged, which justifies the elevated PSA. I have reassured that the patient's PSA level is not extremely elevated in regards to the size of his prostate.    I have given the patient 2 options. 1 option would be to have the patient obtain another PSA in 6 months and continue to monitor his symptoms. The second option would be to perform a biopsy of the prostate. The patient is very anxious about the prospect of potentially having prostate cancer. Therefore he would like to proceed with having a biopsy.     I personally reviewed the patient's previous MRI of the prostate from 12/2019. This imaging demonstrates 2 suspicious lesions.     I encouraged the patient to be seen by a orthopedic surgeon to be evaluated due to his pain and arthritis.     We reviewed the patient's options related to ED. The patient was referred to Dr. Mays previously. We discussed that the patient should focus on the biopsy prior to returning to Dr. Montiel.     Plan:  Ordered [id number]  Fusion biopsy in [place] 02/21  Will refer back to Dr. Mays to discuss intracavernosal injections for ED    Scribe Attestation   By signing my name below, CLARISA, Jhon Dugan, Scribe attestation that this documentation has been prepared under the direction and in the presence of Ramez Vallecillo MD.

## 2025-01-28 NOTE — H&P (VIEW-ONLY)
Subjective   Patient ID: Bal Rosado is a 69 y.o. male.      HPI  69 y.o. male presents with BPH with LUTS, elevated PSA and ED. He is prescribed Cialis 5 mg daily. He presents for prostate MRI results.     The MRI of the prostate completed 12/19 shows a 1 cm left apical peripheral zone PIRADS 4 with possible EPE. He underwent a prostate biopsy on 01/09/2020 that was negative for malignancy. He underwent prostate fusion biopsy on 12/21, all cores were negative for cancer. His PSA's since then have been elevated and fluctuant.    The patient experiences pain in the lower back and hip. He expresses that he has been diagnosed with arthritis in his back due to his career. The patient states that while laying on his right side, he feels that he is laying on top of something. The patient has concerns that the pain in his back and hip are related to cancer.     The patient is experiencing extreme anxiety attacks due to a recent divorce. He states that treatment of prostate cancer in the future could assist him in having a successful relationship in the future.     He has been experiencing ED for several years. He states he has previously tried Cialis and Viagra. He was previously seen by Dr. Mays. He states that Dr. Mays recommended a penile implant or intracavernosal injections for ED.       MR PROSTATE WITH KENA BOUNDARIES IF PIRADS 3 OR ABOVE; 1/15/2025   PROSTATE VOLUME:  The prostate measures  5.5 cm x  4.0 cm  x  5 cm in right-to-left,  anterior-posterior and craniocaudal dimension.  Prostate weight is estimated at 57.6g. PSA density is 0.1 ng/mL/g.  IMPRESSION:  1.  PI-RADS 4 lesion in the right apical peripheral zone      Lab Results   Component Value Date    PSA 5.57 (H) 12/16/2024    PSA 4.6 (H) 10/22/2024    PSA 4.6 (H) 10/16/2023    PSA 4.5 (H) 04/03/2023    PSA 4.9 (H) 09/23/2022         Review of Systems  A complete review of systems was performed. All systems are noted to be negative unless indicated in the  history of present illness, impression, active problem list, or past histories.     Objective   Physical Exam    Assessment/Plan   Diagnoses and all orders for this visit:  Elevated PSA  Erectile dysfunction, unspecified erectile dysfunction type      69 y.o. male presents with BPH with LUTS, elevated PSA and ED. He is prescribed Cialis 5 mg daily. He presents for prostate MRI results.     I personally reviewed the MRI of the prostate on 1/15/2025. The results indicate a lesion of PI-RADS 4 in the right apical peripheral zone. The patient's prostate is enlarged, which justifies the elevated PSA. I have reassured that the patient's PSA level is not extremely elevated in regards to the size of his prostate.    I have given the patient 2 options. 1 option would be to have the patient obtain another PSA in 6 months and continue to monitor his symptoms. The second option would be to perform a biopsy of the prostate. The patient is very anxious about the prospect of potentially having prostate cancer. Therefore he would like to proceed with having a biopsy.     I personally reviewed the patient's previous MRI of the prostate from 12/2019. This imaging demonstrates 2 suspicious lesions.     I encouraged the patient to be seen by a orthopedic surgeon to be evaluated due to his pain and arthritis.     We reviewed the patient's options related to ED. The patient was referred to Dr. Mays previously. We discussed that the patient should focus on the biopsy prior to returning to Dr. Montiel.     Plan:  Ordered UA-UCx  Fusion biopsy in [place] 02/21  Will refer back to Dr. Mays to discuss intracavernosal injections for ED    Scribe Attestation   By signing my name below, CLARISA, Jhon Dugan, Scribe attestation that this documentation has been prepared under the direction and in the presence of Ramez Vallecillo MD.

## 2025-01-30 ENCOUNTER — APPOINTMENT (OUTPATIENT)
Dept: UROLOGY | Facility: CLINIC | Age: 70
End: 2025-01-30
Payer: COMMERCIAL

## 2025-01-30 ENCOUNTER — PREP FOR PROCEDURE (OUTPATIENT)
Dept: UROLOGY | Facility: HOSPITAL | Age: 70
End: 2025-01-30

## 2025-01-30 DIAGNOSIS — R97.20 ELEVATED PSA: Primary | ICD-10-CM

## 2025-01-30 DIAGNOSIS — N52.9 ERECTILE DYSFUNCTION, UNSPECIFIED ERECTILE DYSFUNCTION TYPE: ICD-10-CM

## 2025-01-30 DIAGNOSIS — Z79.2 PROPHYLACTIC ANTIBIOTIC: ICD-10-CM

## 2025-01-30 PROCEDURE — 99214 OFFICE O/P EST MOD 30 MIN: CPT | Performed by: STUDENT IN AN ORGANIZED HEALTH CARE EDUCATION/TRAINING PROGRAM

## 2025-01-30 PROCEDURE — G2211 COMPLEX E/M VISIT ADD ON: HCPCS | Performed by: STUDENT IN AN ORGANIZED HEALTH CARE EDUCATION/TRAINING PROGRAM

## 2025-01-30 RX ORDER — CIPROFLOXACIN 500 MG/1
TABLET ORAL
Qty: 3 TABLET | Refills: 0 | Status: SHIPPED | OUTPATIENT
Start: 2025-01-30

## 2025-02-06 ENCOUNTER — APPOINTMENT (OUTPATIENT)
Dept: UROLOGY | Facility: CLINIC | Age: 70
End: 2025-02-06
Payer: COMMERCIAL

## 2025-02-06 ENCOUNTER — PRE-ADMISSION TESTING (OUTPATIENT)
Dept: PREADMISSION TESTING | Facility: HOSPITAL | Age: 70
End: 2025-02-06
Payer: COMMERCIAL

## 2025-02-06 VITALS
RESPIRATION RATE: 16 BRPM | HEART RATE: 78 BPM | DIASTOLIC BLOOD PRESSURE: 72 MMHG | TEMPERATURE: 96.8 F | OXYGEN SATURATION: 96 % | SYSTOLIC BLOOD PRESSURE: 128 MMHG

## 2025-02-06 LAB
APPEARANCE UR: CLEAR
BILIRUB UR STRIP.AUTO-MCNC: NEGATIVE MG/DL
COLOR UR: COLORLESS
GLUCOSE UR STRIP.AUTO-MCNC: NORMAL MG/DL
KETONES UR STRIP.AUTO-MCNC: NEGATIVE MG/DL
LEUKOCYTE ESTERASE UR QL STRIP.AUTO: NEGATIVE
NITRITE UR QL STRIP.AUTO: NEGATIVE
PH UR STRIP.AUTO: 6 [PH]
PROT UR STRIP.AUTO-MCNC: NEGATIVE MG/DL
RBC # UR STRIP.AUTO: NEGATIVE MG/DL
SP GR UR STRIP.AUTO: 1.01
UROBILINOGEN UR STRIP.AUTO-MCNC: NORMAL MG/DL

## 2025-02-06 PROCEDURE — 81003 URINALYSIS AUTO W/O SCOPE: CPT | Performed by: STUDENT IN AN ORGANIZED HEALTH CARE EDUCATION/TRAINING PROGRAM

## 2025-02-06 ASSESSMENT — DUKE ACTIVITY SCORE INDEX (DASI)
CAN YOU TAKE CARE OF YOURSELF (EAT, DRESS, BATHE, OR USE TOILET): YES
CAN YOU CLIMB A FLIGHT OF STAIRS OR WALK UP A HILL: YES
CAN YOU PARTICIPATE IN STRENOUS SPORTS LIKE SWIMMING, SINGLES TENNIS, FOOTBALL, BASKETBALL, OR SKIING: NO
CAN YOU RUN A SHORT DISTANCE: YES
CAN YOU DO MODERATE WORK AROUND THE HOUSE LIKE VACUUMING, SWEEPING FLOORS OR CARRYING GROCERIES: YES
TOTAL_SCORE: 50.7
CAN YOU DO LIGHT WORK AROUND THE HOUSE LIKE DUSTING OR WASHING DISHES: YES
CAN YOU WALK A BLOCK OR TWO ON LEVEL GROUND: YES
DASI METS SCORE: 9
CAN YOU DO YARD WORK LIKE RAKING LEAVES, WEEDING OR PUSHING A MOWER: YES
CAN YOU DO HEAVY WORK AROUND THE HOUSE LIKE SCRUBBING FLOORS OR LIFTING AND MOVING HEAVY FURNITURE: YES
CAN YOU WALK INDOORS, SUCH AS AROUND YOUR HOUSE: YES
CAN YOU HAVE SEXUAL RELATIONS: YES
CAN YOU PARTICIPATE IN MODERATE RECREATIONAL ACTIVITIES LIKE GOLF, BOWLING, DANCING, DOUBLES TENNIS OR THROWING A BASEBALL OR FOOTBALL: YES

## 2025-02-06 NOTE — PREPROCEDURE INSTRUCTIONS
Medication List            Accurate as of February 6, 2025 11:38 AM. Always use your most recent med list.                atorvastatin 80 mg tablet  Commonly known as: Lipitor  Take 1 tablet (80 mg) by mouth once daily.  Medication Adjustments for Surgery: Take last dose 1 day (24 hours) before surgery     ciprofloxacin 500 mg tablet  Commonly known as: Cipro  Take 1 pill the night before the procedure, 1 pill the morning of the procedure, and 1 pill the night of the procedure.  Medication Adjustments for Surgery: Take/Use as prescribed     diazePAM 10 mg tablet  Commonly known as: Valium  Take 1 tablet (10 mg) by mouth 1 time for 1 dose. Take one hour prior to procedure  Medication Adjustments for Surgery: Take/Use as prescribed     ergocalciferol 1.25 MG (26199 UT) capsule  Commonly known as: Vitamin D-2  Take 1 capsule (50,000 Units) by mouth 1 (one) time per week.  Medication Adjustments for Surgery: Take/Use as prescribed     lisinopril 5 mg tablet  Take 1 tablet (5 mg) by mouth once daily.  Medication Adjustments for Surgery: Take/Use as prescribed     pantoprazole 40 mg EC tablet  Commonly known as: ProtoNix  Take 1 tablet (40 mg) by mouth once daily in the morning. Take before meals. Take 30 minutes before breakfast  Medication Adjustments for Surgery: Take/Use as prescribed     sildenafil 100 mg tablet  Commonly known as: Viagra  Take 1 tablet (100 mg) by mouth if needed for erectile dysfunction.  Medication Adjustments for Surgery: Take/Use as prescribed     tadalafil 5 mg tablet  Commonly known as: Cialis  Take 1 tablet (5 mg) by mouth once daily.  Medication Adjustments for Surgery: Take/Use as prescribed                              NPO Instructions:    Since you are a local- may have a light breakfast- liquids until 2 hours before your procedure. We don't want you to get nauseated on a full stomach for your procedure.     Additional Instructions:     Review your medication instructions, take  indicated medications  Wear  comfortable loose fitting clothing  All jewelry and valuables should be left at home    Park in back of hospital by ER. Come up to Second floor-Outpt dept to check in.  Bring Photo ID and Insurance card,   You MUST have a  with you.  No more than 2 visitors with you please.      If you get ill at all before your procedure- CALL YOUR DOCTOR/SURGEON.  We want you in the best shape that is possible. Any sickness might lead to your procedure being delayed.      Call Outpatient dept at [phone] the night before your procedure (Friday for Monday procedure), between 1-3 pm.      Take cipro antibiotic as prescribed.

## 2025-02-15 ENCOUNTER — OFFICE VISIT (OUTPATIENT)
Dept: URGENT CARE | Age: 70
End: 2025-02-15
Payer: COMMERCIAL

## 2025-02-15 VITALS
WEIGHT: 193.2 LBS | HEART RATE: 78 BPM | SYSTOLIC BLOOD PRESSURE: 153 MMHG | TEMPERATURE: 98.1 F | HEIGHT: 69 IN | RESPIRATION RATE: 16 BRPM | BODY MASS INDEX: 28.61 KG/M2 | DIASTOLIC BLOOD PRESSURE: 83 MMHG | OXYGEN SATURATION: 97 %

## 2025-02-15 DIAGNOSIS — R68.89 FLU-LIKE SYMPTOMS: ICD-10-CM

## 2025-02-15 DIAGNOSIS — J32.9 SINOBRONCHITIS: Primary | ICD-10-CM

## 2025-02-15 DIAGNOSIS — Z20.822 EXPOSURE TO COVID-19 VIRUS: ICD-10-CM

## 2025-02-15 DIAGNOSIS — J40 SINOBRONCHITIS: Primary | ICD-10-CM

## 2025-02-15 LAB
POC RAPID INFLUENZA A: NEGATIVE
POC RAPID INFLUENZA B: NEGATIVE
POC SARS-COV-2 AG BINAX: NORMAL

## 2025-02-15 RX ORDER — BENZONATATE 200 MG/1
200 CAPSULE ORAL 3 TIMES DAILY PRN
Qty: 30 CAPSULE | Refills: 0 | Status: SHIPPED | OUTPATIENT
Start: 2025-02-15 | End: 2025-02-25

## 2025-02-15 RX ORDER — AMOXICILLIN AND CLAVULANATE POTASSIUM 875; 125 MG/1; MG/1
1 TABLET, FILM COATED ORAL 2 TIMES DAILY
Qty: 20 TABLET | Refills: 0 | Status: SHIPPED | OUTPATIENT
Start: 2025-02-15 | End: 2025-02-25

## 2025-02-15 ASSESSMENT — ENCOUNTER SYMPTOMS
FATIGUE: 0
CHILLS: 0
COUGH: 1
SORE THROAT: 1
HEADACHES: 0
FEVER: 0
SINUS PAIN: 0

## 2025-02-15 NOTE — PROGRESS NOTES
Subjective   Patient ID: Bal Rosado is a 69 y.o. male. They present today with a chief complaint of Cough.    History of Present Illness  Patient presents with concern for 7-day history of cold/sinus symptoms.         Cough  Associated symptoms include postnasal drip and a sore throat. Pertinent negatives include no chills, ear pain, fever or headaches.       Past Medical History  Allergies as of 02/15/2025    (No Known Allergies)       (Not in a hospital admission)       Past Medical History:   Diagnosis Date    BPH (benign prostatic hyperplasia)     Coronary artery disease involving native coronary artery of native heart without angina pectoris 08/28/2024     on 8/21/24      Cough, unspecified 11/23/2015    Cough    Essential (primary) hypertension 08/31/2022    Benign essential hypertension    Gastro-esophageal reflux disease without esophagitis 06/29/2022    Esophageal reflux    Hyperlipidemia     Medial meniscus tear 12/22/2023    Personal history of diseases of the skin and subcutaneous tissue 03/09/2016    History of rosacea    Personal history of other drug therapy 09/24/2019    History of influenza vaccination    Personal history of pneumonia (recurrent) 11/23/2015    History of bacterial pneumonia    Pneumonia        Past Surgical History:   Procedure Laterality Date    HERNIA REPAIR Right 01/20/2014    Inguinal    KNEE SURGERY Left 01/20/2014    Meniscus repair    TONSILLECTOMY          reports that he has never smoked. He has never used smokeless tobacco. He reports current alcohol use. He reports that he does not use drugs.    Review of Systems  Review of Systems   Constitutional:  Negative for chills, fatigue and fever.   HENT:  Positive for congestion, postnasal drip and sore throat. Negative for ear pain and sinus pain.    Respiratory:  Positive for cough.    Neurological:  Negative for headaches.                                  Objective    Vitals:    02/15/25 1115   BP: 153/83   BP  "Location: Right arm   Patient Position: Sitting   BP Cuff Size: Adult   Pulse: 78   Resp: 16   Temp: 36.7 °C (98.1 °F)   TempSrc: Oral   SpO2: 97%   Weight: 87.6 kg (193 lb 3.2 oz)   Height: 1.753 m (5' 9\")     No LMP for male patient.    Physical Exam  Vitals reviewed.   Constitutional:       Appearance: Normal appearance.   HENT:      Right Ear: Tympanic membrane and ear canal normal.      Left Ear: Tympanic membrane and ear canal normal.      Mouth/Throat:      Mouth: Mucous membranes are moist.      Pharynx: Oropharynx is clear.   Cardiovascular:      Rate and Rhythm: Normal rate and regular rhythm.   Pulmonary:      Effort: Pulmonary effort is normal.      Breath sounds: Normal breath sounds.   Skin:     General: Skin is warm and dry.   Neurological:      Mental Status: He is alert.         Procedures    Point of Care Test & Imaging Results from this visit  Results for orders placed or performed in visit on 02/15/25   POCT Covid-19 Rapid Antigen   Result Value Ref Range    POC MAXWELL-COV-2 AG  Presumptive negative test for SARS-CoV-2 (no antigen detected)     Presumptive negative test for SARS-CoV-2 (no antigen detected)   POCT Influenza A/B manually resulted   Result Value Ref Range    POC Rapid Influenza A Negative Negative    POC Rapid Influenza B Negative Negative      No results found.    Diagnostic study results (if any) were reviewed by ALEAH Muniz.    Assessment/Plan   Allergies, medications, history, and pertinent labs/EKGs/Imaging reviewed by ALEAH Muniz. Continue supportive measures, and symptom management. Provided prescription for antibiotic and tessalon. F/u if s/s increase or worsen.       Medical Decision Making  At time of discharge patient was clinically well-appearing and HDS for outpatient management. The patient and/or family was educated regarding diagnosis, supportive care, OTC and Rx medications. The patient and/or family was given the opportunity to ask questions " prior to discharge.  They verbalized understanding of my discussion of the plans for treatment, expected course, indications to return to  or seek further evaluation in ED, and the need for timely follow up as directed.   They were provided with a work/school excuse if requested.      Orders and Diagnoses  Diagnoses and all orders for this visit:  Sinobronchitis  -     amoxicillin-pot clavulanate (Augmentin) 875-125 mg tablet; Take 1 tablet by mouth 2 times a day for 10 days.  -     benzonatate (Tessalon) 200 mg capsule; Take 1 capsule (200 mg) by mouth 3 times a day as needed for cough for up to 10 days. Do not crush or chew.  Flu-like symptoms  -     POCT Influenza A/B manually resulted  Exposure to COVID-19 virus  -     POCT Covid-19 Rapid Antigen      Medical Admin Record      Patient disposition: Home    Electronically signed by ALEAH Muniz  11:58 AM

## 2025-02-21 ENCOUNTER — HOSPITAL ENCOUNTER (OUTPATIENT)
Facility: HOSPITAL | Age: 70
Setting detail: OUTPATIENT SURGERY
Discharge: HOME | End: 2025-02-21
Attending: STUDENT IN AN ORGANIZED HEALTH CARE EDUCATION/TRAINING PROGRAM | Admitting: STUDENT IN AN ORGANIZED HEALTH CARE EDUCATION/TRAINING PROGRAM
Payer: COMMERCIAL

## 2025-02-21 VITALS
SYSTOLIC BLOOD PRESSURE: 159 MMHG | TEMPERATURE: 98.6 F | OXYGEN SATURATION: 100 % | RESPIRATION RATE: 16 BRPM | HEIGHT: 69 IN | WEIGHT: 192.9 LBS | BODY MASS INDEX: 28.57 KG/M2 | HEART RATE: 83 BPM | DIASTOLIC BLOOD PRESSURE: 93 MMHG

## 2025-02-21 DIAGNOSIS — R97.20 ELEVATED PSA: Primary | ICD-10-CM

## 2025-02-21 PROCEDURE — 7100000009 HC PHASE TWO TIME - INITIAL BASE CHARGE: Performed by: STUDENT IN AN ORGANIZED HEALTH CARE EDUCATION/TRAINING PROGRAM

## 2025-02-21 PROCEDURE — 2720000007 HC OR 272 NO HCPCS: Performed by: STUDENT IN AN ORGANIZED HEALTH CARE EDUCATION/TRAINING PROGRAM

## 2025-02-21 PROCEDURE — 3600000004 HC OR TIME - INITIAL BASE CHARGE - PROCEDURE LEVEL FOUR: Performed by: STUDENT IN AN ORGANIZED HEALTH CARE EDUCATION/TRAINING PROGRAM

## 2025-02-21 PROCEDURE — 76872 US TRANSRECTAL: CPT | Performed by: STUDENT IN AN ORGANIZED HEALTH CARE EDUCATION/TRAINING PROGRAM

## 2025-02-21 PROCEDURE — 88305 TISSUE EXAM BY PATHOLOGIST: CPT | Performed by: STUDENT IN AN ORGANIZED HEALTH CARE EDUCATION/TRAINING PROGRAM

## 2025-02-21 PROCEDURE — C1819 TISSUE LOCALIZATION-EXCISION: HCPCS | Performed by: STUDENT IN AN ORGANIZED HEALTH CARE EDUCATION/TRAINING PROGRAM

## 2025-02-21 PROCEDURE — 55700 PR PROSTATE NEEDLE BIOPSY ANY APPROACH: CPT | Performed by: STUDENT IN AN ORGANIZED HEALTH CARE EDUCATION/TRAINING PROGRAM

## 2025-02-21 PROCEDURE — 3600000009 HC OR TIME - EACH INCREMENTAL 1 MINUTE - PROCEDURE LEVEL FOUR: Performed by: STUDENT IN AN ORGANIZED HEALTH CARE EDUCATION/TRAINING PROGRAM

## 2025-02-21 PROCEDURE — 96372 THER/PROPH/DIAG INJ SC/IM: CPT | Mod: 59 | Performed by: STUDENT IN AN ORGANIZED HEALTH CARE EDUCATION/TRAINING PROGRAM

## 2025-02-21 PROCEDURE — 88305 TISSUE EXAM BY PATHOLOGIST: CPT | Mod: TC,GENLAB | Performed by: STUDENT IN AN ORGANIZED HEALTH CARE EDUCATION/TRAINING PROGRAM

## 2025-02-21 PROCEDURE — 2500000004 HC RX 250 GENERAL PHARMACY W/ HCPCS (ALT 636 FOR OP/ED): Performed by: STUDENT IN AN ORGANIZED HEALTH CARE EDUCATION/TRAINING PROGRAM

## 2025-02-21 PROCEDURE — 7100000010 HC PHASE TWO TIME - EACH INCREMENTAL 1 MINUTE: Performed by: STUDENT IN AN ORGANIZED HEALTH CARE EDUCATION/TRAINING PROGRAM

## 2025-02-21 PROCEDURE — 76942 ECHO GUIDE FOR BIOPSY: CPT | Performed by: STUDENT IN AN ORGANIZED HEALTH CARE EDUCATION/TRAINING PROGRAM

## 2025-02-21 RX ORDER — GENTAMICIN 40 MG/ML
80 INJECTION, SOLUTION INTRAMUSCULAR; INTRAVENOUS ONCE
Status: COMPLETED | OUTPATIENT
Start: 2025-02-21 | End: 2025-02-21

## 2025-02-21 RX ORDER — ONDANSETRON HYDROCHLORIDE 2 MG/ML
4 INJECTION, SOLUTION INTRAVENOUS ONCE AS NEEDED
Status: DISCONTINUED | OUTPATIENT
Start: 2025-02-21 | End: 2025-02-21 | Stop reason: HOSPADM

## 2025-02-21 RX ORDER — LIDOCAINE HYDROCHLORIDE 10 MG/ML
INJECTION, SOLUTION INFILTRATION; PERINEURAL AS NEEDED
Status: DISCONTINUED | OUTPATIENT
Start: 2025-02-21 | End: 2025-02-21 | Stop reason: HOSPADM

## 2025-02-21 RX ORDER — OXYCODONE HYDROCHLORIDE 5 MG/1
5 TABLET ORAL EVERY 4 HOURS PRN
Status: DISCONTINUED | OUTPATIENT
Start: 2025-02-21 | End: 2025-02-21 | Stop reason: HOSPADM

## 2025-02-21 RX ORDER — ACETAMINOPHEN 325 MG/1
650 TABLET ORAL EVERY 4 HOURS PRN
Status: DISCONTINUED | OUTPATIENT
Start: 2025-02-21 | End: 2025-02-21 | Stop reason: HOSPADM

## 2025-02-21 RX ADMIN — GENTAMICIN SULFATE 80 MG: 40 INJECTION, SOLUTION INTRAMUSCULAR; INTRAVENOUS at 12:49

## 2025-02-21 ASSESSMENT — PAIN SCALES - GENERAL
PAINLEVEL_OUTOF10: 0 - NO PAIN
PAINLEVEL_OUTOF10: 4
PAINLEVEL_OUTOF10: 6
PAINLEVEL_OUTOF10: 0 - NO PAIN

## 2025-02-21 ASSESSMENT — PAIN - FUNCTIONAL ASSESSMENT
PAIN_FUNCTIONAL_ASSESSMENT: 0-10

## 2025-02-21 ASSESSMENT — COLUMBIA-SUICIDE SEVERITY RATING SCALE - C-SSRS
2. HAVE YOU ACTUALLY HAD ANY THOUGHTS OF KILLING YOURSELF?: NO
1. IN THE PAST MONTH, HAVE YOU WISHED YOU WERE DEAD OR WISHED YOU COULD GO TO SLEEP AND NOT WAKE UP?: NO
6. HAVE YOU EVER DONE ANYTHING, STARTED TO DO ANYTHING, OR PREPARED TO DO ANYTHING TO END YOUR LIFE?: NO
6. HAVE YOU EVER DONE ANYTHING, STARTED TO DO ANYTHING, OR PREPARED TO DO ANYTHING TO END YOUR LIFE?: NO
1. IN THE PAST MONTH, HAVE YOU WISHED YOU WERE DEAD OR WISHED YOU COULD GO TO SLEEP AND NOT WAKE UP?: NO
2. HAVE YOU ACTUALLY HAD ANY THOUGHTS OF KILLING YOURSELF?: NO

## 2025-02-21 ASSESSMENT — PAIN DESCRIPTION - DESCRIPTORS: DESCRIPTORS: THROBBING

## 2025-02-21 NOTE — OP NOTE
TRANSRECTAL ULTRASOUND-GUIDED FUSION BIOPSY OF PROSTATE Operative Note     Date: 2025  OR Location: [facility]    Name: Bal Rosado, : 1955, Age: 69 y.o., MRN: [id number], Sex: male    Diagnosis  Pre-op Diagnosis      * Elevated PSA [R97.20] Post-op Diagnosis     * Elevated PSA [R97.20]     Procedures  TRANSRECTAL ULTRASOUND-GUIDED FUSION BIOPSY OF PROSTATE  22242 - PA PROSTATE NEEDLE BIOPSY ANY APPROACH    CHG US TRANSRECTAL [89202]  CHG US GUIDANCE NEEDLE PLACEMENT IMG S&I [17747]  Surgeons      * Ramez Vallecillo - Primary    Resident/Fellow/Other Assistant:  Surgeons and Role:  * No surgeons found with a matching role *    Staff:   Circulator: Gisella  Circulator: Abdirahman Lim Person: Isamar  Circulator: Rachelle    Anesthesia Staff: No anesthesia staff entered.    Procedure Summary  Anesthesia: Anesthesia type not filed in the log.  ASA: ASA status not filed in the log.  Estimated Blood Loss: 1mL  Intra-op Medications: Administrations occurring from 1450 to 1547 on 25:  * No intraprocedure medications in log *           Anesthesia Record               Intraprocedure I/O Totals       None           Specimen:   ID Type Source Tests Collected by Time   1 : KENA #1 Tissue PROSTATE BIOPSY TARGETED KENA SURGICAL PATHOLOGY EXAM Ramez Vallecillo MD 2025 1455                 Drains and/or Catheters: * None in log *    Tourniquet Times:         Implants:     Findings: prostate volume of 69cc, small area of hypoechogenicity at right apex corresponding to KENA    Indications: Bal Rosado is an 69 y.o. male who is having surgery for Elevated PSA [R97.20]. Rising PSA with the last value of 5.5. MRI showed a 5mm right apex PIRADS 4 lesion.    The patient was seen in the preoperative area. The risks, benefits, complications, treatment options, non-operative alternatives, expected recovery and outcomes were discussed with the patient. The possibilities of reaction to medication, pulmonary aspiration,  injury to surrounding structures, bleeding, recurrent infection, the need for additional procedures, failure to diagnose a condition, and creating a complication requiring transfusion or operation were discussed with the patient. The patient concurred with the proposed plan, giving informed consent.  The site of surgery was properly noted/marked if necessary per policy. The patient has been actively warmed in preoperative area. Preoperative antibiotics have been ordered and given within 1 hours of incision. Venous thrombosis prophylaxis are not indicated.    Procedure Details: 1. TRANSRECTAL ULTRASOUND OF PROSTATE    2. ULTRASOUND GUIDED FUSION FOR BIOPSY  3. TARGETED FUSION BIOPSY OF PROSTATE    Patient was consent and antibiotics were administered on-call to the OR.  In the operating room, patient in lateral position, the transrectal ultrasound probe was inserted, and mapping of the prostate was performed.   The prostate was enlarged with some bulging into the bladder base. There was a small hypoechoic area on the right apex corresponding to the KENA on MRI. The measurements of the prostate were W: 52.2mm, H: 41.3mm, L: 61.5mm, with a volume of 69.2cc.     Local anesthetic was administered in Denonvillier's fascia.  4 cores were obtained from the region of interest, then 12 systematic biopsies were obtained.  Patient tolerated procedure well, there was minimal bleeding, and he was transferred to PACU in stable condition.    Complications:  None; patient tolerated the procedure well.    Disposition: PACU - hemodynamically stable.  Condition: stable       Additional Details:     Attending Attestation: I performed the procedure.    Ramez Vallecillo  Phone Number: [phone]

## 2025-02-21 NOTE — DISCHARGE INSTRUCTIONS
Take ibuprofen and Tylenol as needed for pain. Starting 2/23, do not take either of these medications for 48 hours as this may mask a fever which can be a sign of an infection.

## 2025-02-25 ASSESSMENT — PAIN SCALES - GENERAL: PAINLEVEL_OUTOF10: 0 - NO PAIN

## 2025-03-04 LAB
LABORATORY COMMENT REPORT: NORMAL
PATH REPORT.FINAL DX SPEC: NORMAL
PATH REPORT.GROSS SPEC: NORMAL
PATH REPORT.RELEVANT HX SPEC: NORMAL
PATH REPORT.TOTAL CANCER: NORMAL

## 2025-03-08 NOTE — PROGRESS NOTES
Subjective   Patient ID: Bal Rosado is a 69 y.o. male.      HPI  69 y.o. male presents for a postoperative follow up visit regarding TRUS fusion biopsy results. He underwent the TRUS fusion biopsy on 02/21/2025.    He has a history of BPH with LUTS, ED and elevated PSA level. He is currently prescribed Cialis 5 mg daily. PSA 5.57.    He would like to discuss his options for ED. The patient states he would not like to have a penile implant but is considering intracavernosal injections.     Surgical Pathology Exam collected on 02/21/2025.  FINAL DIAGNOSIS   A. PROSTATE, BIOPSY, TARGETED KENA #1:   -- Prostatic tissue with atrophy and no evidence of malignancy     B. PROSTATE, BIOPSY, RIGHT BASE:   -- Prostatic tissue with no evidence of malignancy.   -- Focal chronic inflammation present.     C. PROSTATE, BIOPSY, RIGHT MID:   -- Prostatic tissue with no evidence of malignancy.      D. PROSTATE, BIOPSY, RIGHT APEX:   -- Prostatic tissue with no evidence of malignancy.      E. PROSTATE, BIOPSY, LEFT BASE:   -- Prostatic tissue with no evidence of malignancy.      F. PROSTATE, BIOPSY, LEFT MID:   -- Prostatic tissue with no evidence of malignancy.      G. PROSTATE, BIOPSY, LEFT APEX:      -- Prostatic tissue with no evidence of malignancy     Lab Results   Component Value Date    PSA 5.57 (H) 12/16/2024    PSA 4.6 (H) 10/22/2024    PSA 4.6 (H) 10/16/2023    PSA 4.5 (H) 04/03/2023    PSA 4.9 (H) 09/23/2022          Review of Systems  A complete review of systems was performed. All systems are noted to be negative unless indicated in the history of present illness, impression, active problem list, or past histories.     Objective   Physical Exam    Assessment/Plan   Diagnoses and all orders for this visit:  Elevated PSA  -     Referral to Urology; Future  Erectile dysfunction, unspecified erectile dysfunction type  -     PSA; Future      69 y.o. male presents for a postoperative follow up visit regarding TRUS fusion  biopsy results. He underwent the TRUS fusion biopsy on 02/21/2025.    I personally reviewed the surgical pathology that was collected during the TRUS fusion biopsy on 02/21/2025. The results demonstrate no evidence of malignancy. I reviewed these results with the patient. The patient's results indicate prostatic tissue with atrophy and focal chronic inflammation. These results could be in relation to a prior UTI. Although the PSA level is elevated, could be related to the inflammation and atrophy. I would like to continue to check the PSA level every 6 months. There is a good chance, the PSA level will reduce.     He is to continue Cialis 5 mg daily.     I will have the patient referred to Dr. Rico Tovar about a discussion regarding intracavernosal injections.     He will follow up with his PCP or Dr. Rico Tovar regarding his PSA level. I have instructed him I am more than happy to assist when needed. He is able to follow up as needed.     Plan:  PSA in 6 months - 06/2025  Continue Cialis 5 mg daily   Refer to Dr. Rico Tovar about a consultation regarding intracavernosal injections  Follow up as needed    Scribe Attestation   By signing my name below, I, Jhon Dugan, Scribe attestation that this documentation has been prepared under the direction and in the presence of Ramez Vallecillo MD.

## 2025-03-10 ENCOUNTER — APPOINTMENT (OUTPATIENT)
Dept: UROLOGY | Facility: CLINIC | Age: 70
End: 2025-03-10
Payer: COMMERCIAL

## 2025-03-10 DIAGNOSIS — R97.20 ELEVATED PSA: ICD-10-CM

## 2025-03-10 DIAGNOSIS — N52.9 ERECTILE DYSFUNCTION, UNSPECIFIED ERECTILE DYSFUNCTION TYPE: ICD-10-CM

## 2025-03-10 PROCEDURE — 99213 OFFICE O/P EST LOW 20 MIN: CPT | Performed by: STUDENT IN AN ORGANIZED HEALTH CARE EDUCATION/TRAINING PROGRAM

## 2025-03-10 PROCEDURE — 1159F MED LIST DOCD IN RCRD: CPT | Performed by: STUDENT IN AN ORGANIZED HEALTH CARE EDUCATION/TRAINING PROGRAM

## 2025-03-10 ASSESSMENT — COLUMBIA-SUICIDE SEVERITY RATING SCALE - C-SSRS
6. HAVE YOU EVER DONE ANYTHING, STARTED TO DO ANYTHING, OR PREPARED TO DO ANYTHING TO END YOUR LIFE?: NO
1. IN THE PAST MONTH, HAVE YOU WISHED YOU WERE DEAD OR WISHED YOU COULD GO TO SLEEP AND NOT WAKE UP?: NO
2. HAVE YOU ACTUALLY HAD ANY THOUGHTS OF KILLING YOURSELF?: NO

## 2025-03-10 NOTE — PATIENT INSTRUCTIONS
Please go to nearest outpatient  lab to get labs obtained prior to your next visit    Follow up with PCP and Dr. Garvin

## 2025-03-28 ENCOUNTER — APPOINTMENT (OUTPATIENT)
Dept: UROLOGY | Facility: CLINIC | Age: 70
End: 2025-03-28
Payer: COMMERCIAL

## 2025-04-15 NOTE — PROGRESS NOTES
Subjective   Patient ID: Bal Rosado is a 69 y.o. male    HPI  69 y.o. male who presents to [facility] for elevated PSA and erectile dysfunction. He was referred by Dr. Vallecillo. S/P TRUS fusion biopsy on 02/21/2025 that demonstrate no evidence of malignancy. He has a history of BPH with LUTS, ED and elevated PSA with the most recent being 5.57 on 12/24. Prior 4.6 on 10/24, prior 4.6 on 10/23, prior 4.5 on 04/23, prior 4.9 on 09/22, prior 5.1 on 06/22, prior 3.48 on 08/21. He is currently prescribed Cialis 5 mg daily.     Today, he notes erectile dysfunction x20 years that seems to be worsening. He notes trying Viagra and Cialis in the past with no improvement in his erectile symptoms.     The most recent Surgical Pathology Exam, conducted on 02/21/2025, revealed:  A. PROSTATE, BIOPSY, TARGETED KENA #1:   -- Prostatic tissue with atrophy and no evidence of malignancy     B. PROSTATE, BIOPSY, RIGHT BASE:   -- Prostatic tissue with no evidence of malignancy.   -- Focal chronic inflammation present.     C. PROSTATE, BIOPSY, RIGHT MID:   -- Prostatic tissue with no evidence of malignancy.      D. PROSTATE, BIOPSY, RIGHT APEX:   -- Prostatic tissue with no evidence of malignancy.      E. PROSTATE, BIOPSY, LEFT BASE:   -- Prostatic tissue with no evidence of malignancy.      F. PROSTATE, BIOPSY, LEFT MID:   -- Prostatic tissue with no evidence of malignancy.      G. PROSTATE, BIOPSY, LEFT APEX:      -- Prostatic tissue with no evidence of malignancy     The most recent PSA, conducted on 12/16/2024, revealed:  5.57 ng/mL    The most recent MR prostate, conducted on 01/15/2025, revealed:  1.  PI-RADS 4 lesion in the right apical peripheral zone      Review of Systems    All systems were reviewed. Anything negative was noted in the HPI.    Objective   Physical Exam    General: Well developed, well nourished, alert and cooperative, appears in no acute distress   Eyes: Non-injected conjunctiva, sclera clear, no proptosis    Cardiac: Extremities are warm and well perfused. No edema, cyanosis or pallor   Lungs: Breathing is easy, non-labored. Speaking in clear and complete sentences. Normal diaphragmatic movement   MSK: Ambulatory with steady gait, unassisted   Neuro: Alert and oriented to person, place, and time   Psych: Demonstrates good judgment and reason, without hallucinations, abnormal affect or abnormal behaviors   Skin: No obvious lesions, no rashes       No CVA tenderness bilaterally   No suprapubic pain or discomfort       Past Medical History:   Diagnosis Date    BPH (benign prostatic hyperplasia)     Coronary artery disease involving native coronary artery of native heart without angina pectoris 08/28/2024     on 8/21/24      Cough, unspecified 11/23/2015    Cough    Essential (primary) hypertension 08/31/2022    Benign essential hypertension    Gastro-esophageal reflux disease without esophagitis 06/29/2022    Esophageal reflux    Hyperlipidemia     Medial meniscus tear 12/22/2023    Personal history of diseases of the skin and subcutaneous tissue 03/09/2016    History of rosacea    Personal history of other drug therapy 09/24/2019    History of influenza vaccination    Personal history of pneumonia (recurrent) 11/23/2015    History of bacterial pneumonia    Pneumonia        Past Surgical History:   Procedure Laterality Date    HERNIA REPAIR Right 01/20/2014    Inguinal    KNEE SURGERY Left 01/20/2014    Meniscus repair    TONSILLECTOMY         Assessment/Plan   Elevated PSA, Erectile Dysfunction    69 y.o. male who presents for the above condition,  We had a very long and extensive discussion with the patient regarding elevated PSA. I explained to him the pathophysiology, differential diagnosis, risk factor, associated conditions, and management. I explained to him that elevated PSA could be either due to BPH, prostate infection or inflammation or prostate adenocarcinoma. We discussed the need to do a work-up to  rule out any underlying prostate adenocarcinoma in the form of MR fusion biopsy if his MRI is positive or regular TRUS biopsy of the MRI is negative or we cannot do an MRI of the prostate. We discussed at length the risk, benefit, potential complication, adverse events of prostate biopsy including pain, discomfort, urinary retention, hematuria, pneumaturia, hematospermia. Explained to him that there is a 2% to 5% risk of complicated urinary infection and urosepsis. Explained to him indicates it happens, he will need to be admitted for IV antibiotic for 2 to 3 days at the hospital.      We had a very long and extensive discussion with the patient regarding the pathophysiology, differential diagnosis, risk factor, and management of ED. We discussed at length mechanism of action, risk, benefit, potential complication, adverse events of PDE 5 inhibitors in the form of sildenafil 100mg as needed. Instructed the patient to stop the medication in case of any side effects.    Plan:  - Follow-up in 4 months  - Prescribed Cialis 5 mg daily, 20 mg if needed        E&M visit today is associated with current or anticipated ongoing medical care services related to a patient's single, serious condition or a complex condition.     4/16/2025    Scribe Attestation  By signing my name below, Khadar MCKENNA Scribe attest that this documentation has been prepared under the direction and in the presence of Dr. Ishaan Tovar.

## 2025-04-16 ENCOUNTER — OFFICE VISIT (OUTPATIENT)
Dept: UROLOGY | Facility: HOSPITAL | Age: 70
End: 2025-04-16
Payer: COMMERCIAL

## 2025-04-16 DIAGNOSIS — N52.9 ERECTILE DYSFUNCTION, UNSPECIFIED ERECTILE DYSFUNCTION TYPE: Primary | ICD-10-CM

## 2025-04-16 DIAGNOSIS — R97.20 ELEVATED PSA: ICD-10-CM

## 2025-04-16 PROCEDURE — 99214 OFFICE O/P EST MOD 30 MIN: CPT | Performed by: STUDENT IN AN ORGANIZED HEALTH CARE EDUCATION/TRAINING PROGRAM

## 2025-04-16 PROCEDURE — 1159F MED LIST DOCD IN RCRD: CPT | Performed by: STUDENT IN AN ORGANIZED HEALTH CARE EDUCATION/TRAINING PROGRAM

## 2025-04-16 PROCEDURE — G2211 COMPLEX E/M VISIT ADD ON: HCPCS | Performed by: STUDENT IN AN ORGANIZED HEALTH CARE EDUCATION/TRAINING PROGRAM

## 2025-04-16 RX ORDER — TADALAFIL 5 MG/1
5 TABLET ORAL DAILY
Qty: 30 TABLET | Refills: 6 | Status: SHIPPED | OUTPATIENT
Start: 2025-04-16 | End: 2025-11-12

## 2025-04-16 RX ORDER — TADALAFIL 20 MG/1
20 TABLET ORAL AS NEEDED
Qty: 15 TABLET | Refills: 6 | Status: SHIPPED | OUTPATIENT
Start: 2025-04-16 | End: 2025-05-16

## 2025-06-01 DIAGNOSIS — N52.9 ERECTILE DYSFUNCTION, UNSPECIFIED ERECTILE DYSFUNCTION TYPE: ICD-10-CM

## 2025-06-05 LAB — PSA SERPL-MCNC: 4 NG/ML

## 2025-06-24 ENCOUNTER — APPOINTMENT (OUTPATIENT)
Dept: PRIMARY CARE | Facility: CLINIC | Age: 70
End: 2025-06-24
Payer: COMMERCIAL

## 2025-06-24 VITALS
BODY MASS INDEX: 29.62 KG/M2 | HEART RATE: 66 BPM | HEIGHT: 69 IN | DIASTOLIC BLOOD PRESSURE: 70 MMHG | WEIGHT: 200 LBS | SYSTOLIC BLOOD PRESSURE: 124 MMHG

## 2025-06-24 DIAGNOSIS — E78.2 MIXED HYPERLIPIDEMIA: Primary | ICD-10-CM

## 2025-06-24 DIAGNOSIS — K21.9 GASTROESOPHAGEAL REFLUX DISEASE WITHOUT ESOPHAGITIS: ICD-10-CM

## 2025-06-24 DIAGNOSIS — R97.20 ELEVATED PSA: ICD-10-CM

## 2025-06-24 DIAGNOSIS — I10 ESSENTIAL (PRIMARY) HYPERTENSION: ICD-10-CM

## 2025-06-24 PROBLEM — Z87.2 HISTORY OF ROSACEA: Status: RESOLVED | Noted: 2023-03-06 | Resolved: 2025-06-24

## 2025-06-24 PROCEDURE — 1036F TOBACCO NON-USER: CPT | Performed by: FAMILY MEDICINE

## 2025-06-24 PROCEDURE — 99214 OFFICE O/P EST MOD 30 MIN: CPT | Performed by: FAMILY MEDICINE

## 2025-06-24 PROCEDURE — 3078F DIAST BP <80 MM HG: CPT | Performed by: FAMILY MEDICINE

## 2025-06-24 PROCEDURE — 3008F BODY MASS INDEX DOCD: CPT | Performed by: FAMILY MEDICINE

## 2025-06-24 PROCEDURE — 1159F MED LIST DOCD IN RCRD: CPT | Performed by: FAMILY MEDICINE

## 2025-06-24 PROCEDURE — 3074F SYST BP LT 130 MM HG: CPT | Performed by: FAMILY MEDICINE

## 2025-06-24 RX ORDER — LISINOPRIL 5 MG/1
5 TABLET ORAL DAILY
Qty: 90 TABLET | Refills: 1 | Status: SHIPPED | OUTPATIENT
Start: 2025-06-24 | End: 2025-12-21

## 2025-06-24 RX ORDER — PANTOPRAZOLE SODIUM 40 MG/1
40 TABLET, DELAYED RELEASE ORAL
Qty: 90 TABLET | Refills: 1 | Status: SHIPPED | OUTPATIENT
Start: 2025-06-24 | End: 2025-12-21

## 2025-06-24 RX ORDER — ATORVASTATIN CALCIUM 80 MG/1
80 TABLET, FILM COATED ORAL DAILY
Qty: 90 TABLET | Refills: 1 | Status: SHIPPED | OUTPATIENT
Start: 2025-06-24 | End: 2025-12-21

## 2025-06-24 NOTE — PROGRESS NOTES
Chief Complaint  Patient ID: Bal Rosado is a 69 y.o. male who presents for Hyperlipidemia, GERD, and Hypertension.    Past Medical, Surgical, and Family History reviewed and updated in chart.    Reviewed all medications by prescribing practitioner or clinical pharmacist (such as prescriptions, OTCs, herbal therapies and supplements) and documented in the medical record.    History of Present Illness  1. GERD     - Stable.     - Unaware of trigger foods.     - Needs refill on Protonix 40 mg.    2. Hyperlipidemia (HLD)     - Last lipid panel in December 2024 was well controlled.     - Needs refill on atorvastatin 80 mg.     - Fasting today for repeat blood test.    3. Hypertension (HTN)     - Well controlled with intake reading of 124/70.     - Does not measure at home.     - Needs refill of lisinopril 5 mg.     - CMP ordered for repeat.    4. Elevated PSA     - Following with Urology.     - Had biopsy in February 2025, all returned negative.     - PSA repeated in June 2025, decreased.     - Has appointment coming up in August 2025 for follow-up.    Review of Systems  All pertinent positive symptoms are included in the history of present illness.    All other systems have been reviewed and are negative and noncontributory to this patient's current ailments.    Past Medical History  He has a past medical history of BPH (benign prostatic hyperplasia), Coronary artery disease involving native coronary artery of native heart without angina pectoris (08/28/2024), Cough, unspecified (11/23/2015), Essential (primary) hypertension (08/31/2022), Gastro-esophageal reflux disease without esophagitis (06/29/2022), Hyperlipidemia, Medial meniscus tear (12/22/2023), Personal history of diseases of the skin and subcutaneous tissue (03/09/2016), Personal history of other drug therapy (09/24/2019), Personal history of pneumonia (recurrent) (11/23/2015), and Pneumonia.    Surgical History  He has a past surgical history that includes  "Hernia repair (Right, 01/20/2014); Knee surgery (Left, 01/20/2014); and Tonsillectomy.     Social History  He reports that he has never smoked. He has never used smokeless tobacco. He reports current alcohol use. He reports that he does not use drugs.    Family History[1]  Medications Prior to Visit[2]    Allergies  Patient has no known allergies.    Immunization History   Administered Date(s) Administered    COVID-19, mRNA, LNP-S, PF, 30 mcg/0.3 mL dose 03/31/2021, 04/17/2021, 12/27/2021    Flu vaccine (IIV4), preservative free *Check age/dose* 03/09/2016, 01/19/2017, 09/24/2019    Flu vaccine, trivalent, preservative free, HIGH-DOSE, age 65y+ (Fluzone) 12/16/2024    Influenza, seasonal, injectable 12/10/2012, 10/08/2020    Pneumococcal conjugate vaccine, 13-valent (PREVNAR 13) 03/14/2017    Pneumococcal conjugate vaccine, 20-valent (PREVNAR 20) 06/24/2024    Pneumococcal polysaccharide vaccine, 23-valent, age 2 years and older (PNEUMOVAX 23) 03/09/2016, 08/10/2021    SARS-CoV-2, Unspecified 08/02/2022    Td vaccine, age 7 years and older (TDVAX) 07/01/2018    Tdap vaccine, age 7 year and older (BOOSTRIX, ADACEL) 07/14/2011, 07/01/2017    Zoster vaccine, recombinant, adult (SHINGRIX) 06/24/2024, 09/23/2024     Objective   Visit Vitals  /70   Pulse 66   Ht 1.753 m (5' 9\")   Wt 90.7 kg (200 lb)   BMI 29.53 kg/m²   Smoking Status Never   BSA 2.1 m²        BP Readings from Last 3 Encounters:   06/24/25 124/70   02/21/25 (!) 159/93   02/15/25 153/83      Wt Readings from Last 3 Encounters:   06/24/25 90.7 kg (200 lb)   02/21/25 87.5 kg (192 lb 14.4 oz)   02/15/25 87.6 kg (193 lb 3.2 oz)      Relevant Results  Orders Only on 06/01/2025   Component Date Value    PSA, TOTAL 06/04/2025 4.00      The ASCVD Risk score (Zhanna AGUILERA, et al., 2019) failed to calculate for the following reasons:    The valid total cholesterol range is 130 to 320 mg/dL    Physical Exam  CONSTITUTIONAL - well nourished, well developed, looks " like stated age, in no acute distress, not ill-appearing, and not tired appearing  SKIN - normal skin color and pigmentation, normal skin turgor without rash, lesions, or nodules visualized  HEAD - no trauma, normocephalic  EYES - pupils are equal and reactive to light, extraocular muscles are intact, and normal external exam  NECK - supple without rigidity, no neck mass was observed, no thyromegaly or thyroid nodules  CHEST - clear to auscultation, no wheezing, no crackles and no rales, good effort  CARDIAC - regular rate and regular rhythm, no skipped beats, no murmur  ABDOMEN - no organomegaly, soft, nontender, nondistended, normal bowel sounds, no guarding/rebound/rigidity, negative McBurney sign and negative Teixeira sign  EXTREMITIES - no obvious or evident edema, no obvious or evident deformities  NEUROLOGICAL - alert, oriented and no focal signs  PSYCHIATRIC - alert, pleasant and cordial, age-appropriate  IMMUNOLOGIC - no cervical lymphadenopathy    Assessment and Plan  Problem List Items Addressed This Visit       Elevated PSA    Reviewed last PSA normalized from previous  Continue to follow with urology         Esophageal reflux    Stable, no changes to medication recommended         Relevant Medications    pantoprazole (ProtoNix) 40 mg EC tablet    Other Relevant Orders    CBC    Hyperlipidemia - Primary    Last labs at goal, will repeat for monitoring  Stable, no changes to medication recommended          Relevant Medications    atorvastatin (Lipitor) 80 mg tablet    Other Relevant Orders    Lipid Panel    Essential (primary) hypertension    Stable, no changes to medication recommended  I would like to have you monitor and record blood pressures at home   Blood pressure goal should be below 130/80, ideally 120/80  If the blood pressure is too high or too low, we need to consider making adjustments to your antihypertensive therapy         Relevant Medications    lisinopril 5 mg tablet    Other Relevant  Orders    Comprehensive Metabolic Panel          [1]   Family History  Problem Relation Name Age of Onset    Gaucher's disease Daughter      Gaucher's disease Son      Lung cancer Other      Leukemia Other     [2]   Outpatient Medications Prior to Visit   Medication Sig Dispense Refill    ergocalciferol (Vitamin D-2) 1.25 MG (90565 UT) capsule Take 1 capsule (50,000 Units) by mouth 1 (one) time per week. 12 capsule 3    tadalafil (Cialis) 5 mg tablet Take 1 tablet (5 mg) by mouth once daily. 30 tablet 6    tadalafil 20 mg tablet Take 1 tablet (20 mg) by mouth if needed for erectile dysfunction. 15 tablet 6    atorvastatin (Lipitor) 80 mg tablet Take 1 tablet (80 mg) by mouth once daily. 90 tablet 1    ciprofloxacin (Cipro) 500 mg tablet Take 1 pill the night before the procedure, 1 pill the morning of the procedure, and 1 pill the night of the procedure. 3 tablet 0    diazePAM (Valium) 10 mg tablet Take 1 tablet (10 mg) by mouth 1 time for 1 dose. Take one hour prior to procedure 1 tablet 0    lisinopril 5 mg tablet Take 1 tablet (5 mg) by mouth once daily. 90 tablet 1    pantoprazole (ProtoNix) 40 mg EC tablet Take 1 tablet (40 mg) by mouth once daily in the morning. Take before meals. Take 30 minutes before breakfast 90 tablet 1    sildenafil (Viagra) 100 mg tablet Take 1 tablet (100 mg) by mouth if needed for erectile dysfunction. 30 tablet 2    tadalafil (Cialis) 5 mg tablet Take 1 tablet (5 mg) by mouth once daily. 90 tablet 1     No facility-administered medications prior to visit.

## 2025-06-25 LAB
ALBUMIN SERPL-MCNC: 4.3 G/DL (ref 3.6–5.1)
ALP SERPL-CCNC: 44 U/L (ref 35–144)
ALT SERPL-CCNC: 14 U/L (ref 9–46)
ANION GAP SERPL CALCULATED.4IONS-SCNC: 7 MMOL/L (CALC) (ref 7–17)
AST SERPL-CCNC: 17 U/L (ref 10–35)
BILIRUB SERPL-MCNC: 0.5 MG/DL (ref 0.2–1.2)
BUN SERPL-MCNC: 19 MG/DL (ref 7–25)
CALCIUM SERPL-MCNC: 9.1 MG/DL (ref 8.6–10.3)
CHLORIDE SERPL-SCNC: 109 MMOL/L (ref 98–110)
CHOLEST SERPL-MCNC: 148 MG/DL
CHOLEST/HDLC SERPL: 2.3 (CALC)
CO2 SERPL-SCNC: 22 MMOL/L (ref 20–32)
CREAT SERPL-MCNC: 0.78 MG/DL (ref 0.7–1.35)
EGFRCR SERPLBLD CKD-EPI 2021: 97 ML/MIN/1.73M2
ERYTHROCYTE [DISTWIDTH] IN BLOOD BY AUTOMATED COUNT: 13.1 % (ref 11–15)
GLUCOSE SERPL-MCNC: 104 MG/DL (ref 65–99)
HCT VFR BLD AUTO: 41.6 % (ref 38.5–50)
HDLC SERPL-MCNC: 64 MG/DL
HGB BLD-MCNC: 13.3 G/DL (ref 13.2–17.1)
LDLC SERPL CALC-MCNC: 70 MG/DL (CALC)
MCH RBC QN AUTO: 32.8 PG (ref 27–33)
MCHC RBC AUTO-ENTMCNC: 32 G/DL (ref 32–36)
MCV RBC AUTO: 102.5 FL (ref 80–100)
NONHDLC SERPL-MCNC: 84 MG/DL (CALC)
PLATELET # BLD AUTO: 233 THOUSAND/UL (ref 140–400)
PMV BLD REES-ECKER: 9.8 FL (ref 7.5–12.5)
POTASSIUM SERPL-SCNC: 4.9 MMOL/L (ref 3.5–5.3)
PROT SERPL-MCNC: 6.5 G/DL (ref 6.1–8.1)
RBC # BLD AUTO: 4.06 MILLION/UL (ref 4.2–5.8)
SODIUM SERPL-SCNC: 138 MMOL/L (ref 135–146)
TRIGL SERPL-MCNC: 60 MG/DL
WBC # BLD AUTO: 5.2 THOUSAND/UL (ref 3.8–10.8)

## 2025-06-25 NOTE — RESULT ENCOUNTER NOTE
Kidney, electrolytes, liver normal  Complete blood cell count stable  Cholesterol excellent at 148 with LDL 70 previously 128 and 59

## 2025-08-19 ENCOUNTER — APPOINTMENT (OUTPATIENT)
Dept: UROLOGY | Facility: HOSPITAL | Age: 70
End: 2025-08-19
Payer: COMMERCIAL

## 2025-08-19 DIAGNOSIS — N52.9 ERECTILE DYSFUNCTION, UNSPECIFIED ERECTILE DYSFUNCTION TYPE: Primary | ICD-10-CM

## 2025-08-19 PROCEDURE — 99212 OFFICE O/P EST SF 10 MIN: CPT

## 2025-08-19 PROCEDURE — 99214 OFFICE O/P EST MOD 30 MIN: CPT | Performed by: STUDENT IN AN ORGANIZED HEALTH CARE EDUCATION/TRAINING PROGRAM

## 2025-08-19 PROCEDURE — 1159F MED LIST DOCD IN RCRD: CPT | Performed by: STUDENT IN AN ORGANIZED HEALTH CARE EDUCATION/TRAINING PROGRAM

## 2025-08-19 RX ORDER — TADALAFIL 20 MG/1
20 TABLET ORAL AS NEEDED
Qty: 15 TABLET | Refills: 6 | Status: SHIPPED | OUTPATIENT
Start: 2025-08-19 | End: 2025-09-18

## 2025-08-19 RX ORDER — TADALAFIL 5 MG/1
5 TABLET ORAL DAILY
Qty: 90 TABLET | Refills: 3 | Status: SHIPPED | OUTPATIENT
Start: 2025-08-19 | End: 2026-08-19

## 2025-08-26 ENCOUNTER — APPOINTMENT (OUTPATIENT)
Dept: PRIMARY CARE | Facility: CLINIC | Age: 70
End: 2025-08-26
Payer: COMMERCIAL

## 2025-09-04 ENCOUNTER — APPOINTMENT (OUTPATIENT)
Dept: PRIMARY CARE | Facility: CLINIC | Age: 70
End: 2025-09-04
Payer: COMMERCIAL

## 2025-09-04 ASSESSMENT — PATIENT HEALTH QUESTIONNAIRE - PHQ9
2. FEELING DOWN, DEPRESSED OR HOPELESS: NOT AT ALL
1. LITTLE INTEREST OR PLEASURE IN DOING THINGS: NOT AT ALL
SUM OF ALL RESPONSES TO PHQ9 QUESTIONS 1 AND 2: 0

## (undated) DEVICE — ULTRASOUND LATEX FREE PROBE COVER

## (undated) DEVICE — NEEDLE, SPINAL, QUINCKE, 22 G X 7 IN, BLACK HUB

## (undated) DEVICE — MARKER, SKIN, DUAL TIP INK W/9 LABEL AND REMOVABLE TIME OUT SLEEVE

## (undated) DEVICE — COVER, TABLE, 44 X 75 IN, DISPOSABLE, LF, STERILE

## (undated) DEVICE — DRESSING, GAUZE, PAD, 4 X 4 IN, STERILE

## (undated) DEVICE — DRESSING, NON-ADHERENT, TELFA, OUCHLESS, 3 X 8 IN, STERILE

## (undated) DEVICE — CUP, MEDICINE, CLEAR, 2 OZ, STERILE

## (undated) DEVICE — SYRINGE, LUER LOCK, 12ML

## (undated) DEVICE — Device

## (undated) DEVICE — NEEDLE, BIOPSY, MAX CORE, 18G

## (undated) DEVICE — GLOVE, SURGICAL, PROTEXIS PI , 8.0, PF, LF